# Patient Record
Sex: FEMALE | Race: WHITE | NOT HISPANIC OR LATINO | Employment: OTHER | URBAN - METROPOLITAN AREA
[De-identification: names, ages, dates, MRNs, and addresses within clinical notes are randomized per-mention and may not be internally consistent; named-entity substitution may affect disease eponyms.]

---

## 2017-01-01 PROBLEM — T84.7XXA INFECTION AT SITE OF EXTERNAL FIXATOR PIN (HCC): Status: ACTIVE | Noted: 2017-01-01

## 2017-05-09 PROBLEM — Z96.9 RETAINED ORTHOPEDIC HARDWARE: Status: ACTIVE | Noted: 2017-05-09

## 2019-09-20 ENCOUNTER — HOSPITAL ENCOUNTER (OUTPATIENT)
Dept: RADIOLOGY | Facility: MEDICAL CENTER | Age: 54
End: 2019-09-20
Attending: ORTHOPAEDIC SURGERY
Payer: MEDICARE

## 2019-09-20 DIAGNOSIS — M24.572 CONTRACTURE OF LEFT ANKLE: ICD-10-CM

## 2019-09-20 DIAGNOSIS — M79.672 PAIN IN LEFT FOOT: ICD-10-CM

## 2019-09-20 PROCEDURE — 73700 CT LOWER EXTREMITY W/O DYE: CPT | Mod: LT

## 2019-12-10 ENCOUNTER — APPOINTMENT (OUTPATIENT)
Dept: ADMISSIONS | Facility: MEDICAL CENTER | Age: 54
End: 2019-12-10
Payer: MEDICARE

## 2019-12-10 RX ORDER — ZOLPIDEM TARTRATE 10 MG/1
10 TABLET ORAL
COMMUNITY
End: 2020-10-02

## 2019-12-11 NOTE — DISCHARGE PLANNING
DISCHARGE PLANNING NOTE     Procedure: Procedure(s):  OSTEOTOMY, ORTHOPEDIC-MIDFOOT, HERRING  Procedure Date: 12/27/2019  Insurance:  Payor: MEDICARE / Plan: MEDICARE PART A & B  Equipment currently available at home? crutches, front-wheel walker, wheelchair and kneeling scooter  Toilet height? Standard  Type of shower? tub-shower  Who will be with you during your recovery? dtr  Is Outpatient Physical Therapy set up after surgery? No      Plan: Spoke with Jojo on the phone. She lives in [place], [place]. She states it has been 7 years since she obtained her wheelchair and states that it is broken. 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 She cannot use her kneeling scooter for long distance due to arthritis in her knees. She would like a tub transfer bench and 3:1 commode as she is about 60 feet from her bathroom. She would like a new set of crutches, the rubber on her current pair is worn out. She would like to see if Medicare would pay for these items if not she will purchase them.  Notified Dr. Freeman's MA of these request and asked that she submit the orders to any [employer] company but [employer].

## 2019-12-12 NOTE — DISCHARGE PLANNING
Message left for Dr. Freeman's MA requesting they submit orders for DME to any company but [employer] and have equipment sent to Jojo's home. 3:1 commode, crutches, tub transfer bench and a wheelchair.

## 2019-12-26 ENCOUNTER — ANESTHESIA EVENT (OUTPATIENT)
Dept: SURGERY | Facility: MEDICAL CENTER | Age: 54
End: 2019-12-26
Payer: MEDICARE

## 2019-12-27 ENCOUNTER — ANESTHESIA (OUTPATIENT)
Dept: SURGERY | Facility: MEDICAL CENTER | Age: 54
End: 2019-12-27
Payer: MEDICARE

## 2019-12-27 ENCOUNTER — HOSPITAL ENCOUNTER (OUTPATIENT)
Facility: MEDICAL CENTER | Age: 54
End: 2019-12-30
Attending: ORTHOPAEDIC SURGERY | Admitting: ORTHOPAEDIC SURGERY
Payer: MEDICARE

## 2019-12-27 ENCOUNTER — APPOINTMENT (OUTPATIENT)
Dept: RADIOLOGY | Facility: MEDICAL CENTER | Age: 54
End: 2019-12-27
Attending: ORTHOPAEDIC SURGERY
Payer: MEDICARE

## 2019-12-27 DIAGNOSIS — G89.18 POSTOPERATIVE PAIN: ICD-10-CM

## 2019-12-27 PROCEDURE — 96365 THER/PROPH/DIAG IV INF INIT: CPT | Mod: XU

## 2019-12-27 PROCEDURE — 700111 HCHG RX REV CODE 636 W/ 250 OVERRIDE (IP)

## 2019-12-27 PROCEDURE — 700101 HCHG RX REV CODE 250: Performed by: ANESTHESIOLOGY

## 2019-12-27 PROCEDURE — A6223 GAUZE >16<=48 NO W/SAL W/O B: HCPCS | Performed by: ORTHOPAEDIC SURGERY

## 2019-12-27 PROCEDURE — 160048 HCHG OR STATISTICAL LEVEL 1-5: Performed by: ORTHOPAEDIC SURGERY

## 2019-12-27 PROCEDURE — 700102 HCHG RX REV CODE 250 W/ 637 OVERRIDE(OP): Performed by: ANESTHESIOLOGY

## 2019-12-27 PROCEDURE — 700111 HCHG RX REV CODE 636 W/ 250 OVERRIDE (IP): Performed by: ANESTHESIOLOGY

## 2019-12-27 PROCEDURE — 700105 HCHG RX REV CODE 258

## 2019-12-27 PROCEDURE — 160036 HCHG PACU - EA ADDL 30 MINS PHASE I: Performed by: ORTHOPAEDIC SURGERY

## 2019-12-27 PROCEDURE — 160002 HCHG RECOVERY MINUTES (STAT): Performed by: ORTHOPAEDIC SURGERY

## 2019-12-27 PROCEDURE — A9270 NON-COVERED ITEM OR SERVICE: HCPCS | Performed by: ANESTHESIOLOGY

## 2019-12-27 PROCEDURE — 73600 X-RAY EXAM OF ANKLE: CPT | Mod: LT

## 2019-12-27 PROCEDURE — 500423 HCHG DRESSING, ABD COMBINE: Performed by: ORTHOPAEDIC SURGERY

## 2019-12-27 PROCEDURE — 160029 HCHG SURGERY MINUTES - 1ST 30 MINS LEVEL 4: Performed by: ORTHOPAEDIC SURGERY

## 2019-12-27 PROCEDURE — 500881 HCHG PACK, EXTREMITY: Performed by: ORTHOPAEDIC SURGERY

## 2019-12-27 PROCEDURE — A9270 NON-COVERED ITEM OR SERVICE: HCPCS | Performed by: ORTHOPAEDIC SURGERY

## 2019-12-27 PROCEDURE — 160009 HCHG ANES TIME/MIN: Performed by: ORTHOPAEDIC SURGERY

## 2019-12-27 PROCEDURE — 96375 TX/PRO/DX INJ NEW DRUG ADDON: CPT | Mod: XU

## 2019-12-27 PROCEDURE — G0378 HOSPITAL OBSERVATION PER HR: HCPCS

## 2019-12-27 PROCEDURE — 700105 HCHG RX REV CODE 258: Performed by: ORTHOPAEDIC SURGERY

## 2019-12-27 PROCEDURE — 160041 HCHG SURGERY MINUTES - EA ADDL 1 MIN LEVEL 4: Performed by: ORTHOPAEDIC SURGERY

## 2019-12-27 PROCEDURE — 501838 HCHG SUTURE GENERAL: Performed by: ORTHOPAEDIC SURGERY

## 2019-12-27 PROCEDURE — 160035 HCHG PACU - 1ST 60 MINS PHASE I: Performed by: ORTHOPAEDIC SURGERY

## 2019-12-27 PROCEDURE — 700102 HCHG RX REV CODE 250 W/ 637 OVERRIDE(OP): Performed by: ORTHOPAEDIC SURGERY

## 2019-12-27 PROCEDURE — 160022 HCHG BLOCK: Performed by: ORTHOPAEDIC SURGERY

## 2019-12-27 PROCEDURE — 96376 TX/PRO/DX INJ SAME DRUG ADON: CPT | Mod: XU

## 2019-12-27 PROCEDURE — 700111 HCHG RX REV CODE 636 W/ 250 OVERRIDE (IP): Performed by: ORTHOPAEDIC SURGERY

## 2019-12-27 RX ORDER — ALBUTEROL SULFATE 90 UG/1
1-2 AEROSOL, METERED RESPIRATORY (INHALATION) EVERY 4 HOURS PRN
Status: DISCONTINUED | OUTPATIENT
Start: 2019-12-27 | End: 2019-12-30 | Stop reason: HOSPADM

## 2019-12-27 RX ORDER — HYDROMORPHONE HYDROCHLORIDE 1 MG/ML
0.1 INJECTION, SOLUTION INTRAMUSCULAR; INTRAVENOUS; SUBCUTANEOUS
Status: DISCONTINUED | OUTPATIENT
Start: 2019-12-27 | End: 2019-12-27 | Stop reason: HOSPADM

## 2019-12-27 RX ORDER — ONDANSETRON 2 MG/ML
4 INJECTION INTRAMUSCULAR; INTRAVENOUS
Status: COMPLETED | OUTPATIENT
Start: 2019-12-27 | End: 2019-12-27

## 2019-12-27 RX ORDER — SODIUM CHLORIDE, SODIUM LACTATE, POTASSIUM CHLORIDE, CALCIUM CHLORIDE 600; 310; 30; 20 MG/100ML; MG/100ML; MG/100ML; MG/100ML
INJECTION, SOLUTION INTRAVENOUS CONTINUOUS
Status: DISCONTINUED | OUTPATIENT
Start: 2019-12-27 | End: 2019-12-27

## 2019-12-27 RX ORDER — ACETAMINOPHEN 500 MG
1000 TABLET ORAL ONCE
Status: COMPLETED | OUTPATIENT
Start: 2019-12-27 | End: 2019-12-27

## 2019-12-27 RX ORDER — DEXTROAMPHETAMINE SACCHARATE, AMPHETAMINE ASPARTATE, DEXTROAMPHETAMINE SULFATE AND AMPHETAMINE SULFATE 2.5; 2.5; 2.5; 2.5 MG/1; MG/1; MG/1; MG/1
20 TABLET ORAL DAILY
Status: DISCONTINUED | OUTPATIENT
Start: 2019-12-28 | End: 2019-12-30 | Stop reason: HOSPADM

## 2019-12-27 RX ORDER — SODIUM CHLORIDE, SODIUM LACTATE, POTASSIUM CHLORIDE, CALCIUM CHLORIDE 600; 310; 30; 20 MG/100ML; MG/100ML; MG/100ML; MG/100ML
INJECTION, SOLUTION INTRAVENOUS CONTINUOUS
Status: DISCONTINUED | OUTPATIENT
Start: 2019-12-27 | End: 2019-12-27 | Stop reason: HOSPADM

## 2019-12-27 RX ORDER — ONDANSETRON 2 MG/ML
4 INJECTION INTRAMUSCULAR; INTRAVENOUS
Status: DISCONTINUED | OUTPATIENT
Start: 2019-12-27 | End: 2019-12-27

## 2019-12-27 RX ORDER — CARBOXYMETHYLCELLULOSE SODIUM 5 MG/ML
1 SOLUTION/ DROPS OPHTHALMIC PRN
Status: DISCONTINUED | OUTPATIENT
Start: 2019-12-27 | End: 2019-12-30 | Stop reason: HOSPADM

## 2019-12-27 RX ORDER — HYDROMORPHONE HYDROCHLORIDE 1 MG/ML
0.5 INJECTION, SOLUTION INTRAMUSCULAR; INTRAVENOUS; SUBCUTANEOUS
Status: DISCONTINUED | OUTPATIENT
Start: 2019-12-27 | End: 2019-12-29

## 2019-12-27 RX ORDER — MEPERIDINE HYDROCHLORIDE 25 MG/ML
6.25 INJECTION INTRAMUSCULAR; INTRAVENOUS; SUBCUTANEOUS
Status: DISCONTINUED | OUTPATIENT
Start: 2019-12-27 | End: 2019-12-27

## 2019-12-27 RX ORDER — SODIUM CHLORIDE 9 MG/ML
INJECTION, SOLUTION INTRAVENOUS
Status: COMPLETED
Start: 2019-12-27 | End: 2019-12-28

## 2019-12-27 RX ORDER — IBUPROFEN 200 MG
800 TABLET ORAL 3 TIMES DAILY
Status: DISCONTINUED | OUTPATIENT
Start: 2019-12-27 | End: 2019-12-30 | Stop reason: HOSPADM

## 2019-12-27 RX ORDER — LIDOCAINE HYDROCHLORIDE 40 MG/ML
SOLUTION TOPICAL PRN
Status: DISCONTINUED | OUTPATIENT
Start: 2019-12-27 | End: 2019-12-27 | Stop reason: SURG

## 2019-12-27 RX ORDER — FLUTICASONE PROPIONATE 50 MCG
1 SPRAY, SUSPENSION (ML) NASAL DAILY
COMMUNITY

## 2019-12-27 RX ORDER — DIPHENHYDRAMINE HYDROCHLORIDE 50 MG/ML
12.5 INJECTION INTRAMUSCULAR; INTRAVENOUS
Status: DISCONTINUED | OUTPATIENT
Start: 2019-12-27 | End: 2019-12-27

## 2019-12-27 RX ORDER — MEPERIDINE HYDROCHLORIDE 25 MG/ML
INJECTION INTRAMUSCULAR; INTRAVENOUS; SUBCUTANEOUS PRN
Status: DISCONTINUED | OUTPATIENT
Start: 2019-12-27 | End: 2019-12-27 | Stop reason: SURG

## 2019-12-27 RX ORDER — OXYCODONE HYDROCHLORIDE 5 MG/1
5 TABLET ORAL
Status: DISCONTINUED | OUTPATIENT
Start: 2019-12-27 | End: 2019-12-27

## 2019-12-27 RX ORDER — OXYCODONE HYDROCHLORIDE AND ACETAMINOPHEN 5; 325 MG/1; MG/1
1 TABLET ORAL EVERY 4 HOURS PRN
Status: DISCONTINUED | OUTPATIENT
Start: 2019-12-27 | End: 2019-12-30 | Stop reason: HOSPADM

## 2019-12-27 RX ORDER — GABAPENTIN 300 MG/1
300 CAPSULE ORAL ONCE
Status: DISCONTINUED | OUTPATIENT
Start: 2019-12-27 | End: 2019-12-27 | Stop reason: HOSPADM

## 2019-12-27 RX ORDER — GABAPENTIN 300 MG/1
300 CAPSULE ORAL EVERY EVENING
Status: DISCONTINUED | OUTPATIENT
Start: 2019-12-27 | End: 2019-12-30 | Stop reason: HOSPADM

## 2019-12-27 RX ORDER — MIDAZOLAM HYDROCHLORIDE 1 MG/ML
1 INJECTION INTRAMUSCULAR; INTRAVENOUS
Status: DISCONTINUED | OUTPATIENT
Start: 2019-12-27 | End: 2019-12-27 | Stop reason: HOSPADM

## 2019-12-27 RX ORDER — HALOPERIDOL 5 MG/ML
1 INJECTION INTRAMUSCULAR
Status: DISCONTINUED | OUTPATIENT
Start: 2019-12-27 | End: 2019-12-27

## 2019-12-27 RX ORDER — ALBUTEROL SULFATE 90 UG/1
1-2 AEROSOL, METERED RESPIRATORY (INHALATION) EVERY 4 HOURS PRN
Status: ON HOLD | COMMUNITY
Start: 2019-08-12 | End: 2020-02-17

## 2019-12-27 RX ORDER — HALOPERIDOL 5 MG/ML
1 INJECTION INTRAMUSCULAR
Status: DISCONTINUED | OUTPATIENT
Start: 2019-12-27 | End: 2019-12-27 | Stop reason: HOSPADM

## 2019-12-27 RX ORDER — DIPHENHYDRAMINE HYDROCHLORIDE 50 MG/ML
12.5 INJECTION INTRAMUSCULAR; INTRAVENOUS
Status: DISCONTINUED | OUTPATIENT
Start: 2019-12-27 | End: 2019-12-27 | Stop reason: HOSPADM

## 2019-12-27 RX ORDER — FLUTICASONE PROPIONATE 50 MCG
1 SPRAY, SUSPENSION (ML) NASAL DAILY
Status: DISCONTINUED | OUTPATIENT
Start: 2019-12-27 | End: 2019-12-30 | Stop reason: HOSPADM

## 2019-12-27 RX ORDER — OXYCODONE HCL 5 MG/5 ML
10 SOLUTION, ORAL ORAL
Status: DISCONTINUED | OUTPATIENT
Start: 2019-12-27 | End: 2019-12-27

## 2019-12-27 RX ORDER — ZOLPIDEM TARTRATE 5 MG/1
10 TABLET ORAL NIGHTLY PRN
Status: DISCONTINUED | OUTPATIENT
Start: 2019-12-27 | End: 2019-12-30 | Stop reason: HOSPADM

## 2019-12-27 RX ORDER — OXYCODONE HCL 5 MG/5 ML
5 SOLUTION, ORAL ORAL
Status: COMPLETED | OUTPATIENT
Start: 2019-12-27 | End: 2019-12-27

## 2019-12-27 RX ORDER — ACETAMINOPHEN 500 MG
1000 TABLET ORAL EVERY 6 HOURS
Status: DISCONTINUED | OUTPATIENT
Start: 2019-12-27 | End: 2019-12-27

## 2019-12-27 RX ORDER — ONDANSETRON 2 MG/ML
4 INJECTION INTRAMUSCULAR; INTRAVENOUS EVERY 4 HOURS PRN
Status: DISCONTINUED | OUTPATIENT
Start: 2019-12-27 | End: 2019-12-30 | Stop reason: HOSPADM

## 2019-12-27 RX ORDER — OXYCODONE AND ACETAMINOPHEN 10; 325 MG/1; MG/1
1 TABLET ORAL EVERY 4 HOURS PRN
Status: DISCONTINUED | OUTPATIENT
Start: 2019-12-27 | End: 2019-12-30 | Stop reason: HOSPADM

## 2019-12-27 RX ORDER — LIDOCAINE HYDROCHLORIDE 10 MG/ML
INJECTION, SOLUTION EPIDURAL; INFILTRATION; INTRACAUDAL; PERINEURAL
Status: COMPLETED
Start: 2019-12-27 | End: 2019-12-27

## 2019-12-27 RX ORDER — DEXAMETHASONE SODIUM PHOSPHATE 4 MG/ML
INJECTION, SOLUTION INTRA-ARTICULAR; INTRALESIONAL; INTRAMUSCULAR; INTRAVENOUS; SOFT TISSUE PRN
Status: DISCONTINUED | OUTPATIENT
Start: 2019-12-27 | End: 2019-12-27 | Stop reason: SURG

## 2019-12-27 RX ORDER — OXYCODONE HCL 5 MG/5 ML
5 SOLUTION, ORAL ORAL
Status: DISCONTINUED | OUTPATIENT
Start: 2019-12-27 | End: 2019-12-27

## 2019-12-27 RX ORDER — ONDANSETRON 2 MG/ML
INJECTION INTRAMUSCULAR; INTRAVENOUS PRN
Status: DISCONTINUED | OUTPATIENT
Start: 2019-12-27 | End: 2019-12-27 | Stop reason: SURG

## 2019-12-27 RX ORDER — DEXTROAMPHETAMINE SACCHARATE, AMPHETAMINE ASPARTATE, DEXTROAMPHETAMINE SULFATE AND AMPHETAMINE SULFATE 5; 5; 5; 5 MG/1; MG/1; MG/1; MG/1
20 TABLET ORAL DAILY
Status: ON HOLD | COMMUNITY
End: 2020-02-17

## 2019-12-27 RX ORDER — OXYCODONE HCL 5 MG/5 ML
10 SOLUTION, ORAL ORAL
Status: COMPLETED | OUTPATIENT
Start: 2019-12-27 | End: 2019-12-27

## 2019-12-27 RX ORDER — HYDROMORPHONE HYDROCHLORIDE 1 MG/ML
0.2 INJECTION, SOLUTION INTRAMUSCULAR; INTRAVENOUS; SUBCUTANEOUS
Status: DISCONTINUED | OUTPATIENT
Start: 2019-12-27 | End: 2019-12-27 | Stop reason: HOSPADM

## 2019-12-27 RX ORDER — MEPERIDINE HYDROCHLORIDE 25 MG/ML
6.25 INJECTION INTRAMUSCULAR; INTRAVENOUS; SUBCUTANEOUS
Status: DISCONTINUED | OUTPATIENT
Start: 2019-12-27 | End: 2019-12-27 | Stop reason: HOSPADM

## 2019-12-27 RX ORDER — CEFAZOLIN SODIUM 2 G/100ML
2 INJECTION, SOLUTION INTRAVENOUS EVERY 8 HOURS
Status: COMPLETED | OUTPATIENT
Start: 2019-12-27 | End: 2019-12-28

## 2019-12-27 RX ORDER — HYDROMORPHONE HYDROCHLORIDE 1 MG/ML
0.4 INJECTION, SOLUTION INTRAMUSCULAR; INTRAVENOUS; SUBCUTANEOUS
Status: DISCONTINUED | OUTPATIENT
Start: 2019-12-27 | End: 2019-12-27 | Stop reason: HOSPADM

## 2019-12-27 RX ORDER — BUPIVACAINE HYDROCHLORIDE 5 MG/ML
INJECTION, SOLUTION EPIDURAL; INTRACAUDAL PRN
Status: DISCONTINUED | OUTPATIENT
Start: 2019-12-27 | End: 2019-12-27 | Stop reason: SURG

## 2019-12-27 RX ORDER — OXYCODONE HYDROCHLORIDE 10 MG/1
10 TABLET ORAL
Status: DISCONTINUED | OUTPATIENT
Start: 2019-12-27 | End: 2019-12-27

## 2019-12-27 RX ADMIN — MIDAZOLAM 1 MG: 1 INJECTION INTRAMUSCULAR; INTRAVENOUS at 12:36

## 2019-12-27 RX ADMIN — CEFAZOLIN SODIUM 2 G: 2 INJECTION, SOLUTION INTRAVENOUS at 23:42

## 2019-12-27 RX ADMIN — FENTANYL CITRATE 50 MCG: 0.05 INJECTION, SOLUTION INTRAMUSCULAR; INTRAVENOUS at 12:30

## 2019-12-27 RX ADMIN — SODIUM CHLORIDE: 9 INJECTION, SOLUTION INTRAVENOUS at 15:30

## 2019-12-27 RX ADMIN — MEPERIDINE HYDROCHLORIDE 25 MG: 25 INJECTION INTRAMUSCULAR; INTRAVENOUS; SUBCUTANEOUS at 11:25

## 2019-12-27 RX ADMIN — Medication 0.5 ML: at 09:30

## 2019-12-27 RX ADMIN — OXYCODONE HYDROCHLORIDE 10 MG: 5 SOLUTION ORAL at 12:01

## 2019-12-27 RX ADMIN — SODIUM CHLORIDE, POTASSIUM CHLORIDE, SODIUM LACTATE AND CALCIUM CHLORIDE: 600; 310; 30; 20 INJECTION, SOLUTION INTRAVENOUS at 09:32

## 2019-12-27 RX ADMIN — LIDOCAINE HYDROCHLORIDE 0.5 ML: 10 INJECTION, SOLUTION EPIDURAL; INFILTRATION; INTRACAUDAL at 09:30

## 2019-12-27 RX ADMIN — ROCURONIUM BROMIDE 50 MG: 10 INJECTION, SOLUTION INTRAVENOUS at 09:58

## 2019-12-27 RX ADMIN — OXYCODONE HYDROCHLORIDE AND ACETAMINOPHEN 1 TABLET: 10; 325 TABLET ORAL at 18:36

## 2019-12-27 RX ADMIN — CEFAZOLIN SODIUM 2 G: 2 INJECTION, SOLUTION INTRAVENOUS at 15:23

## 2019-12-27 RX ADMIN — DEXAMETHASONE SODIUM PHOSPHATE 8 MG: 4 INJECTION, SOLUTION INTRA-ARTICULAR; INTRALESIONAL; INTRAMUSCULAR; INTRAVENOUS; SOFT TISSUE at 10:10

## 2019-12-27 RX ADMIN — MIDAZOLAM 1 MG: 1 INJECTION INTRAMUSCULAR; INTRAVENOUS at 12:11

## 2019-12-27 RX ADMIN — FENTANYL CITRATE 100 MCG: 50 INJECTION, SOLUTION INTRAMUSCULAR; INTRAVENOUS at 11:30

## 2019-12-27 RX ADMIN — HYDROMORPHONE HYDROCHLORIDE 0.5 MG: 1 INJECTION, SOLUTION INTRAMUSCULAR; INTRAVENOUS; SUBCUTANEOUS at 23:42

## 2019-12-27 RX ADMIN — LIDOCAINE HYDROCHLORIDE 4 ML: 40 SOLUTION TOPICAL at 10:00

## 2019-12-27 RX ADMIN — PROPOFOL 200 MG: 10 INJECTION, EMULSION INTRAVENOUS at 09:58

## 2019-12-27 RX ADMIN — ONDANSETRON 4 MG: 2 INJECTION INTRAMUSCULAR; INTRAVENOUS at 11:00

## 2019-12-27 RX ADMIN — CLINDAMYCIN PHOSPHATE 900 MG: 150 INJECTION, SOLUTION INTRAMUSCULAR; INTRAVENOUS at 09:59

## 2019-12-27 RX ADMIN — BUPIVACAINE HYDROCHLORIDE 30 ML: 5 INJECTION, SOLUTION EPIDURAL; INTRACAUDAL; PERINEURAL at 10:02

## 2019-12-27 RX ADMIN — HYDROMORPHONE HYDROCHLORIDE 0.5 MG: 1 INJECTION, SOLUTION INTRAMUSCULAR; INTRAVENOUS; SUBCUTANEOUS at 19:46

## 2019-12-27 RX ADMIN — HYDROMORPHONE HYDROCHLORIDE 0.5 MG: 1 INJECTION, SOLUTION INTRAMUSCULAR; INTRAVENOUS; SUBCUTANEOUS at 16:38

## 2019-12-27 RX ADMIN — IBUPROFEN 800 MG: 200 TABLET, FILM COATED ORAL at 23:42

## 2019-12-27 RX ADMIN — OXYCODONE HYDROCHLORIDE 10 MG: 10 TABLET ORAL at 15:21

## 2019-12-27 RX ADMIN — ONDANSETRON 4 MG: 2 INJECTION INTRAMUSCULAR; INTRAVENOUS at 12:09

## 2019-12-27 RX ADMIN — FENTANYL CITRATE 50 MCG: 0.05 INJECTION, SOLUTION INTRAMUSCULAR; INTRAVENOUS at 12:00

## 2019-12-27 RX ADMIN — HALOPERIDOL LACTATE 1 MG: 5 INJECTION, SOLUTION INTRAMUSCULAR at 12:45

## 2019-12-27 RX ADMIN — OXYCODONE HYDROCHLORIDE AND ACETAMINOPHEN 1 TABLET: 10; 325 TABLET ORAL at 22:46

## 2019-12-27 RX ADMIN — ACETAMINOPHEN 1000 MG: 500 TABLET ORAL at 09:30

## 2019-12-27 SDOH — HEALTH STABILITY: MENTAL HEALTH: HOW OFTEN DO YOU HAVE 6 OR MORE DRINKS ON ONE OCCASION?: NEVER

## 2019-12-27 SDOH — HEALTH STABILITY: MENTAL HEALTH: HOW OFTEN DO YOU HAVE A DRINK CONTAINING ALCOHOL?: NEVER

## 2019-12-27 ASSESSMENT — PATIENT HEALTH QUESTIONNAIRE - PHQ9
2. FEELING DOWN, DEPRESSED, IRRITABLE, OR HOPELESS: NOT AT ALL
1. LITTLE INTEREST OR PLEASURE IN DOING THINGS: NOT AT ALL
SUM OF ALL RESPONSES TO PHQ9 QUESTIONS 1 AND 2: 0

## 2019-12-27 ASSESSMENT — LIFESTYLE VARIABLES
TOTAL SCORE: 0
CONSUMPTION TOTAL: NEGATIVE
TOTAL SCORE: 0
HAVE PEOPLE ANNOYED YOU BY CRITICIZING YOUR DRINKING: NO
TOTAL SCORE: 0
AVERAGE NUMBER OF DAYS PER WEEK YOU HAVE A DRINK CONTAINING ALCOHOL: 0
EVER FELT BAD OR GUILTY ABOUT YOUR DRINKING: NO
ON A TYPICAL DAY WHEN YOU DRINK ALCOHOL HOW MANY DRINKS DO YOU HAVE: 0
HAVE YOU EVER FELT YOU SHOULD CUT DOWN ON YOUR DRINKING: NO
HOW MANY TIMES IN THE PAST YEAR HAVE YOU HAD 5 OR MORE DRINKS IN A DAY: 0
EVER HAD A DRINK FIRST THING IN THE MORNING TO STEADY YOUR NERVES TO GET RID OF A HANGOVER: NO
ALCOHOL_USE: NO
DOES PATIENT WANT TO STOP DRINKING: NO
EVER_SMOKED: NEVER

## 2019-12-27 ASSESSMENT — COGNITIVE AND FUNCTIONAL STATUS - GENERAL
CLIMB 3 TO 5 STEPS WITH RAILING: A LITTLE
TOILETING: A LITTLE
SUGGESTED CMS G CODE MODIFIER MOBILITY: CJ
HELP NEEDED FOR BATHING: A LITTLE
DRESSING REGULAR LOWER BODY CLOTHING: A LITTLE
SUGGESTED CMS G CODE MODIFIER DAILY ACTIVITY: CJ
MOBILITY SCORE: 22
WALKING IN HOSPITAL ROOM: A LITTLE
DAILY ACTIVITIY SCORE: 21

## 2019-12-27 ASSESSMENT — PAIN SCALES - GENERAL: PAIN_LEVEL: 5

## 2019-12-27 ASSESSMENT — COPD QUESTIONNAIRES
DO YOU EVER COUGH UP ANY MUCUS OR PHLEGM?: NO/ONLY WITH OCCASIONAL COLDS OR INFECTIONS
IN THE PAST 12 MONTHS DO YOU DO LESS THAN YOU USED TO BECAUSE OF YOUR BREATHING PROBLEMS: DISAGREE/UNSURE
DURING THE PAST 4 WEEKS HOW MUCH DID YOU FEEL SHORT OF BREATH: NONE/LITTLE OF THE TIME
COPD SCREENING SCORE: 1
HAVE YOU SMOKED AT LEAST 100 CIGARETTES IN YOUR ENTIRE LIFE: NO/DON'T KNOW

## 2019-12-27 NOTE — OP REPORT
DATE OF SERVICE:  12/27/2019    PREOPERATIVE DIAGNOSES:  1.  Left Achilles contracture.  2.  Left previous Achilles surgery with calcifications.  3.  Left anterior ankle impingement.  4.  Left lateral foot overloading.    POSTOPERATIVE DIAGNOSES:  1.  Left Achilles contracture.  2.  Left previous Achilles surgery with calcifications.  3.  Left anterior ankle impingement.  4.  Left lateral foot overloading.    PROCEDURES PERFORMED:  1.  Left Achilles debridement including calcifications.  2.  Left Achilles lengthening, open Z lengthening.  3.  Left posterior ankle capsular release.  4.  Left ankle manipulation.  5.  Left ankle arthrotomy with synovectomy, anterior.  6.  Left partial excision distal tibia for bossing.  7.  Left partial excision dorsal anterior talus for bossing.  8.  Left plantar fifth metatarsal base excision.    SURGEON:  Home Freeman MD    FIRST ASSISTANT:  Veronique Oviedo MD    SECOND ASSISTANT:  Anisa Soto.    ANESTHESIA:  General endotracheal with popliteal block per my request for   postoperative pain management.    ESTIMATED BLOOD LOSS:  None.    COMPLICATIONS:  None.    POSTOPERATIVE PLAN:  1.  Nonweightbearing.  2.  Perioperative antibiotics.  3.  Follow up in 2 weeks.  4.  Admit for pain control.    INDICATIONS:  The patient is a pleasant 54-year-old female.  She has had   problems with her left ankle for some time.  The above diagnoses made and   options were discussed with her including operative and nonoperative.  She   elected to undergo operative intervention.  The above procedure was discussed   and all questions were answered.  Risks of surgery were explained, which   include but not limited to wound problems, infection, nerve injury, vascular   injury, need for further surgery.  She understands she could have persistent   risks of her pain and/or need for further surgery.  She understands and   accepts these risks and agrees to proceed.  Her site was marked by myself   prior  to receiving psychotropic medicines.    PROCEDURE IN DETAIL:  The patient was given a popliteal block per my request   for postoperative pain management.  She was brought to the operating room and   underwent general endotracheal anesthesia without complications.  Her left   lower extremity was prepped and draped in standard fashion in the prone   position initial with all appropriate padding.  Positive site verification   confirmed her left lower extremity as well as above procedure and confirmation   that she received preoperative antibiotics.  Esmarch was used to exsanguinate   her foot and ankle and leg tourniquet was inflated 250 mmHg.  A longitudinal   incision was made starting above her previous incision and going distal.  This   was carefully dissected down to avoid injury to the sural nerve.  Once this   was down to the Achilles tendon, this was then split.  She had multiple areas   of calcification and these were excised.  On the most lateral aspect, she   still had some Achilles intact.  Once this was intact, a Z Z-lengthening was   performed.  Ankle manipulation was performed and I was able to get some   improvement, but not a significant improvement in dorsiflexion.  Posterior   dissection was then made.  We stayed to the lateral side of her FHL and the   FHL was then elevated up and off of the posterior ankle.  A Tai elevator was   used to carefully release posterior ankle capsule.  This also gave some   improvement in dorsiflexion.  However, anterior impingement felt as though it   was blocking her ankle.  The wound was irrigated with copious irrigation.  It   was closed in a layered fashion with 3-0 Vicryl and 3-0 nylon.  A sterile   Tegaderm was placed.    The patient was then repositioned, prepped and draped in the supine position   with all appropriate padding.  A medial arthrotomy was made, carefully   dissected down.  She had a lot of synovitis in the anterior part of her ankle   and so a full  synovectomy was performed with a rongeur down to visualization   of her cartilage of her talus.  An osteotome was then used to remove the   anterior distal tibia from medial to lateral and a gouge was then used to   remove the portion of her talus that was causing anterior impingement.  This   was cut just anterior to her cartilage of her ankle joint.  Once this was   completely excised from medial to lateral, further ankle manipulation was   performed gently and I was able to stretch her up to approximately 10 degrees   of dorsiflexion.  At that time, reevaluation of her foot demonstrated that her   forefoot really was neutral to the long axis of her tibia.  She had a little   bit of prominence at the base of her fifth metatarsal as she was overloading   and so a separate incision was made over the base of her fifth metatarsal.    This was carefully dissected down to avoid injury to the sural nerve.  Once   this was down, a microsagittal saw was used to remove the plantar and lateral   aspect of the fifth metatarsal base.  This was smoothed by buffing it out and   there was no further prominences.  At the completion, her foot had good   neutral alignment.  There were no further prominences laterally.  Wounds were   then irrigated with copious irrigation.  They were closed in a layered fashion   with 3-0 Vicryl and 3-0 nylon.  Sterile dressings were applied.  Tourniquet   released.  All toes were pink.  She was transferred to recovery room in good   condition.    Utilization of Dr. Oviedo was necessary for patient positioning, holding,   retracting, closure, dressing and splint placement.  She was present   throughout the entire procedure.       ____________________________________     MD ADAM SELF / NTS    DD:  12/27/2019 15:11:35  DT:  12/27/2019 15:44:28    D#:  [id number]  Job#:  [id number]    cc: [facility]

## 2019-12-27 NOTE — PROGRESS NOTES
"While transferring patient to commode, she refused the use of the walker and stated, \" I have done this my whole life, I don't need a walker or someone to help me get to the toilet\". This RN educated patient of her fall risk at this point and that for her own safety, we need to assist her while she gets out of bed for any reason. Patient stated understanding and asked me to leave while she used the commode; this RN asked patient to call staff when she was finished. Approximately 2-3 minutes later, CNA entered room and found patient in bed. Patient re-educated on her need to call staff. Patient refusing bed alarm as she has history of chronic hip problems. Charting outside of room.   "

## 2019-12-27 NOTE — PROGRESS NOTES
"Patient in pre op, arrived at 0830 for 945 surgery. Pt angrily refused to do chlorhexidine wipes due to \"it will thomas my skin.\" pt tried iodine swabs and stated it burned her nose and wiped it out with water. Pt educated on purpose of the triple aim process and still refuses.   "

## 2019-12-27 NOTE — ANESTHESIA PROCEDURE NOTES
Peripheral Block  Date/Time: 12/27/2019 10:02 AM  Performed by: Reggie Peoples M.D.  Authorized by: Reggie Peoples M.D.     Patient Location:  OR  Start Time:  12/27/2019 10:02 AM  End Time:  12/27/2019 10:04 AM  Reason for Block: at surgeon's request and post-op pain management    patient identified, IV checked, site marked, risks and benefits discussed, surgical consent, monitors and equipment checked, pre-op evaluation and timeout performed    Patient Position:  Supine  Prep: ChloraPrep    Monitoring:  Heart rate, continuous pulse ox and cardiac monitor  Block Region:  Lower Extremity  Lower Extremity - Block Type:  SCIATIC nerve block, lateral approach    Laterality:  Left  Procedures: ultrasound guided  Image captured, interpreted and electronically stored.  Local Infiltration:  Lidocaine  Strength:  1 %  Dose:  3 ml  Block Type:  Single-shot  Needle Length:  100mm  Needle Gauge:  21 G  Needle Localization:  Ultrasound guidance  Injection Assessment:  Negative aspiration for heme, no paresthesia on injection, incremental injection and local visualized surrounding nerve on ultrasound  Evidence of intravascular injection: No     US Guided Sciatic Nerve Block   US probe placed several cm proximal to popliteal crease on posterior thigh and scanned caudad and cephalad until Sciatic Nerve (SN) identified superficial/lateral to popliteal artery.  Needle inserted lateral to probe in an in plane approach under direct visualization to a perineural position.  After negative aspiration LA injected with ease and visualized surrounding the SN.   performed under general anesthesia per patient request.

## 2019-12-27 NOTE — ANESTHESIA POSTPROCEDURE EVALUATION
Patient: Jojo Walker    Procedure Summary     Date:  12/27/19 Room / Location:  [place] [address] / SURGERY [facility]    Anesthesia Start:  0952 Anesthesia Stop:  1138    Procedure:  OSTEOTOMY, ORTHOPEDIC-MIDFOOT, HERRING (Left Foot) Diagnosis:  (PAIN IN LEFT FOOT)    Surgeon:  Home Freeman M.D. Responsible Provider:  Reggie Peoples M.D.    Anesthesia Type:  general, peripheral nerve block ASA Status:  2          Final Anesthesia Type: general, peripheral nerve block  Last vitals  BP   Blood Pressure: (!) 95/53    Temp   35.9 °C (96.7 °F)    Pulse   Pulse: (!) 54   Resp   15    SpO2   100 %      Anesthesia Post Evaluation    Patient location during evaluation: PACU  Patient participation: complete - patient participated  Level of consciousness: awake and alert  Pain score: 5    Airway patency: patent  Anesthetic complications: no  Cardiovascular status: hemodynamically stable  Respiratory status: acceptable  Hydration status: euvolemic    PONV: none           Nurse Pain Score: 8 (NPRS)

## 2019-12-27 NOTE — ANESTHESIA TIME REPORT
Anesthesia Start and Stop Event Times     Date Time Event    12/27/2019 0942 Ready for Procedure     0952 Anesthesia Start     1138 Anesthesia Stop        Responsible Staff  12/27/19    Name Role Begin End    Reggie Peoples M.D. Anesth 0952 1138        Preop Diagnosis (Free Text):  Pre-op Diagnosis     PAIN IN LEFT FOOT        Preop Diagnosis (Codes):    Post op Diagnosis  Left foot pain      Premium Reason  Non-Premium    Comments: block

## 2019-12-27 NOTE — OR NURSING
"Pt sleeping in stable condition; arouses easily and will c/o of L foot \"throbbing\". Updated daughter; report given to Allyson CAIN on Ortho.  "

## 2019-12-27 NOTE — OR SURGEON
Immediate Post OP Note    PreOp Diagnosis: left equinus deformity    PostOp Diagnosis: same    Procedure(s):  OSTEOTOMY, ORTHOPEDIC-MIDFOOT, HERRING - Wound Class: Clean  gastroc recession  Capsular release  Lateral metatarsal osteoctomy    Surgeon(s):  Home Freeman M.D.    Anesthesiologist/Type of Anesthesia:  Anesthesiologist: Reggie Peoples M.D./General    Surgical Staff:  Circulator: Elvia Carmona R.N.; Lukas D. Gansert, R.N.  Scrub Person: Guadalupe Pete  First Assist: Anisa Soto  Radiology Technologist: Marina Reina    Specimens removed if any:  * No specimens in log *    Estimated Blood Loss: 2 cc    Findings: 2 cc    Complications:         12/27/2019 11:36 AM Veronique Oviedo M.D.

## 2019-12-27 NOTE — OR NURSING
"Pt awake and stable; c/o severe \"throbbing\" pain on top of her foot. Daughter updated to pt status. Awaiting room assignment.  "

## 2019-12-27 NOTE — PROGRESS NOTES
Patient pre-op assessment completed, patient very anxious and worried about pain after surgery. Pt voices concerns to Md Freeman and [place]. Multiple discussions had about coming up with a plan the patient is comfortable with. pt updated on plan of care, all questions answered, no further needs at this time, call light within reach.

## 2019-12-27 NOTE — ANESTHESIA PREPROCEDURE EVALUATION
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   (+) Hormone replacement therapy (HRT)   (+) Infection at site of external fixator pin (HCC)   (+) Pain in thoracic spine   (+) Retained orthopedic hardware     Vitals:    12/27/19 0829   BP: 134/65   Pulse: 72   Resp: 18   Temp: 36.4 °C (97.5 °F)   SpO2: 97%       Physical Exam    Airway   Mallampati: II  TM distance: >3 FB  Neck ROM: full       Cardiovascular - normal exam  Rhythm: regular  Rate: normal  (-) murmur     Dental - normal exam         Pulmonary - normal exam  Breath sounds clear to auscultation     Abdominal    Neurological - normal exam                 Anesthesia Plan    ASA 2       Plan - general and peripheral nerve block     Peripheral nerve block will be post-op pain control  Airway plan will be LMA        Induction: intravenous    Postoperative Plan: Postoperative administration of opioids is intended.    Pertinent diagnostic labs and testing reviewed    Informed Consent:    Anesthetic plan and risks discussed with patient.    Use of blood products discussed with: patient whom consented to blood products.

## 2019-12-27 NOTE — PROGRESS NOTES
2 RN skin check complete with ANT Rodrigez.  Devices in place SCD to right lower extremity and nasal cannula.  Skin assessed under devices Yes.  Confirmed pressure ulcers found on NA.  New potential pressure ulcers noted on NA. Wound consult placed NA.  The following interventions in place Encourage Q2 hour turns while in bed, ambulation and pillows in use for support.   Dressing to left lower extremity, clean, dry and intact.

## 2019-12-28 PROCEDURE — G0378 HOSPITAL OBSERVATION PER HR: HCPCS

## 2019-12-28 PROCEDURE — 96372 THER/PROPH/DIAG INJ SC/IM: CPT

## 2019-12-28 PROCEDURE — 700102 HCHG RX REV CODE 250 W/ 637 OVERRIDE(OP): Performed by: ORTHOPAEDIC SURGERY

## 2019-12-28 PROCEDURE — A9270 NON-COVERED ITEM OR SERVICE: HCPCS | Performed by: ORTHOPAEDIC SURGERY

## 2019-12-28 PROCEDURE — 96366 THER/PROPH/DIAG IV INF ADDON: CPT

## 2019-12-28 PROCEDURE — 96376 TX/PRO/DX INJ SAME DRUG ADON: CPT

## 2019-12-28 PROCEDURE — 700111 HCHG RX REV CODE 636 W/ 250 OVERRIDE (IP): Performed by: ORTHOPAEDIC SURGERY

## 2019-12-28 RX ADMIN — OXYCODONE HYDROCHLORIDE AND ACETAMINOPHEN 1 TABLET: 10; 325 TABLET ORAL at 06:18

## 2019-12-28 RX ADMIN — HYDROMORPHONE HYDROCHLORIDE 0.5 MG: 1 INJECTION, SOLUTION INTRAMUSCULAR; INTRAVENOUS; SUBCUTANEOUS at 11:30

## 2019-12-28 RX ADMIN — CEFAZOLIN SODIUM 2 G: 2 INJECTION, SOLUTION INTRAVENOUS at 06:18

## 2019-12-28 RX ADMIN — OXYCODONE HYDROCHLORIDE AND ACETAMINOPHEN 1 TABLET: 10; 325 TABLET ORAL at 14:26

## 2019-12-28 RX ADMIN — HYDROMORPHONE HYDROCHLORIDE 0.5 MG: 1 INJECTION, SOLUTION INTRAMUSCULAR; INTRAVENOUS; SUBCUTANEOUS at 15:53

## 2019-12-28 RX ADMIN — ENOXAPARIN SODIUM 40 MG: 100 INJECTION SUBCUTANEOUS at 08:28

## 2019-12-28 RX ADMIN — ZOLPIDEM TARTRATE 10 MG: 5 TABLET ORAL at 20:06

## 2019-12-28 RX ADMIN — HYDROMORPHONE HYDROCHLORIDE 0.5 MG: 1 INJECTION, SOLUTION INTRAMUSCULAR; INTRAVENOUS; SUBCUTANEOUS at 03:34

## 2019-12-28 RX ADMIN — IBUPROFEN 800 MG: 200 TABLET, FILM COATED ORAL at 08:28

## 2019-12-28 RX ADMIN — OXYCODONE HYDROCHLORIDE AND ACETAMINOPHEN 1 TABLET: 10; 325 TABLET ORAL at 10:23

## 2019-12-28 RX ADMIN — OXYCODONE HYDROCHLORIDE AND ACETAMINOPHEN 1 TABLET: 5; 325 TABLET ORAL at 02:20

## 2019-12-28 RX ADMIN — HYDROMORPHONE HYDROCHLORIDE 0.5 MG: 1 INJECTION, SOLUTION INTRAMUSCULAR; INTRAVENOUS; SUBCUTANEOUS at 07:39

## 2019-12-28 RX ADMIN — FLUTICASONE PROPIONATE 50 MCG: 50 SPRAY, METERED NASAL at 06:18

## 2019-12-28 RX ADMIN — OXYCODONE HYDROCHLORIDE AND ACETAMINOPHEN 1 TABLET: 10; 325 TABLET ORAL at 20:06

## 2019-12-28 RX ADMIN — IBUPROFEN 800 MG: 200 TABLET, FILM COATED ORAL at 17:05

## 2019-12-28 NOTE — CARE PLAN
Problem: Communication  Goal: The ability to communicate needs accurately and effectively will improve  Outcome: PROGRESSING AS EXPECTED  Intervention: Educate patient and significant other/support system about the plan of care, procedures, treatments, medications and allow for questions  Note:   Patient alert and oriented, she is able to communicate needs effectively with the use of her call light. Updated in regards to POC for this shift, answered questions accordingly. Verbalizes understanding, hourly rounding in progress.     Problem: Pain Management  Goal: Pain level will decrease to patient's comfort goal  Outcome: PROGRESSING AS EXPECTED  Intervention: Educate and implement non-pharmacologic comfort measures. Examples: relaxation, distration, play therapy, activity therapy, massage, etc.  Note:   Patient aware of pharmacological interventions available, verbalizes understanding. Education about non-pharmacological interventions provided, patient verbalizes understanding.      Problem: Respiratory:  Goal: Respiratory status will improve  Outcome: PROGRESSING AS EXPECTED  Intervention: Educate and encourage incentive spirometry usage  Note:   Assessed patient's ability to perform deep breathing exercises, patient demonstrates task appropriately. Provided education about importance of using incentive spirometry, at a minimum of ten times per hour. Patient verbalizes understanding and demonstrates task appropriately.

## 2019-12-28 NOTE — PROGRESS NOTES
Patient stating that pain is uncontrolled with the Roxicodone 10mg. Patient stated that she takes Percocet at home. Paged .

## 2019-12-28 NOTE — CARE PLAN
Problem: Safety  Goal: Will remain free from falls  Outcome: PROGRESSING AS EXPECTED  Patient re-educated on fall risk and need to call for staff when getting out of bed. Patient states understanding. Call light within reach, bed in locked and lowest position, DME (including commode) in bathroom, refused fall alarm, RN charting outside of room.      Problem: Knowledge Deficit  Goal: Knowledge of disease process/condition, treatment plan, diagnostic tests, and medications will improve  Outcome: PROGRESSING AS EXPECTED   Patient understands plan of care at this time. All questions answered.

## 2019-12-29 PROBLEM — G89.18 POSTOPERATIVE PAIN: Status: ACTIVE | Noted: 2019-12-29

## 2019-12-29 PROCEDURE — 700111 HCHG RX REV CODE 636 W/ 250 OVERRIDE (IP): Performed by: PHYSICIAN ASSISTANT

## 2019-12-29 PROCEDURE — 96372 THER/PROPH/DIAG INJ SC/IM: CPT

## 2019-12-29 PROCEDURE — G0378 HOSPITAL OBSERVATION PER HR: HCPCS

## 2019-12-29 PROCEDURE — A9270 NON-COVERED ITEM OR SERVICE: HCPCS | Performed by: ORTHOPAEDIC SURGERY

## 2019-12-29 PROCEDURE — 700112 HCHG RX REV CODE 229: Performed by: PHYSICIAN ASSISTANT

## 2019-12-29 PROCEDURE — 700102 HCHG RX REV CODE 250 W/ 637 OVERRIDE(OP): Performed by: PHYSICIAN ASSISTANT

## 2019-12-29 PROCEDURE — A9270 NON-COVERED ITEM OR SERVICE: HCPCS | Performed by: PHYSICIAN ASSISTANT

## 2019-12-29 PROCEDURE — 700102 HCHG RX REV CODE 250 W/ 637 OVERRIDE(OP): Performed by: ORTHOPAEDIC SURGERY

## 2019-12-29 PROCEDURE — 700111 HCHG RX REV CODE 636 W/ 250 OVERRIDE (IP): Performed by: ORTHOPAEDIC SURGERY

## 2019-12-29 PROCEDURE — 96376 TX/PRO/DX INJ SAME DRUG ADON: CPT

## 2019-12-29 RX ORDER — OXYCODONE AND ACETAMINOPHEN 10; 325 MG/1; MG/1
1 TABLET ORAL EVERY 4 HOURS PRN
Qty: 30 TAB | Refills: 0 | Status: SHIPPED | OUTPATIENT
Start: 2019-12-29 | End: 2020-01-05

## 2019-12-29 RX ORDER — IBUPROFEN 800 MG/1
800 TABLET ORAL 3 TIMES DAILY
Qty: 30 TAB | Refills: 0 | Status: ON HOLD | OUTPATIENT
Start: 2019-12-29 | End: 2020-02-17

## 2019-12-29 RX ORDER — ESTRADIOL 1 MG/1
0.5 TABLET ORAL DAILY
Status: DISCONTINUED | OUTPATIENT
Start: 2019-12-29 | End: 2019-12-30 | Stop reason: HOSPADM

## 2019-12-29 RX ORDER — DOCUSATE SODIUM 100 MG/1
100 CAPSULE, LIQUID FILLED ORAL 2 TIMES DAILY PRN
Status: DISCONTINUED | OUTPATIENT
Start: 2019-12-29 | End: 2019-12-30 | Stop reason: HOSPADM

## 2019-12-29 RX ORDER — GABAPENTIN 300 MG/1
300 CAPSULE ORAL EVERY EVENING
Qty: 90 CAP | Refills: 0 | Status: SHIPPED | OUTPATIENT
Start: 2019-12-29 | End: 2020-02-14

## 2019-12-29 RX ORDER — PSEUDOEPHEDRINE HCL 30 MG
100 TABLET ORAL 2 TIMES DAILY PRN
Qty: 60 CAP | Refills: 0 | Status: ON HOLD | OUTPATIENT
Start: 2019-12-29 | End: 2020-02-17

## 2019-12-29 RX ORDER — HYDROMORPHONE HYDROCHLORIDE 1 MG/ML
0.5 INJECTION, SOLUTION INTRAMUSCULAR; INTRAVENOUS; SUBCUTANEOUS EVERY 8 HOURS PRN
Status: DISCONTINUED | OUTPATIENT
Start: 2019-12-29 | End: 2019-12-29

## 2019-12-29 RX ORDER — HYDROMORPHONE HYDROCHLORIDE 1 MG/ML
0.5 INJECTION, SOLUTION INTRAMUSCULAR; INTRAVENOUS; SUBCUTANEOUS
Status: DISCONTINUED | OUTPATIENT
Start: 2019-12-29 | End: 2019-12-30 | Stop reason: HOSPADM

## 2019-12-29 RX ADMIN — IBUPROFEN 800 MG: 200 TABLET, FILM COATED ORAL at 18:10

## 2019-12-29 RX ADMIN — OXYCODONE HYDROCHLORIDE AND ACETAMINOPHEN 1 TABLET: 10; 325 TABLET ORAL at 08:30

## 2019-12-29 RX ADMIN — OXYCODONE HYDROCHLORIDE AND ACETAMINOPHEN 1 TABLET: 10; 325 TABLET ORAL at 00:18

## 2019-12-29 RX ADMIN — HYDROMORPHONE HYDROCHLORIDE 0.5 MG: 1 INJECTION, SOLUTION INTRAMUSCULAR; INTRAVENOUS; SUBCUTANEOUS at 13:30

## 2019-12-29 RX ADMIN — FLUTICASONE PROPIONATE 50 MCG: 50 SPRAY, METERED NASAL at 04:23

## 2019-12-29 RX ADMIN — OXYCODONE HYDROCHLORIDE AND ACETAMINOPHEN 1 TABLET: 10; 325 TABLET ORAL at 12:20

## 2019-12-29 RX ADMIN — DOCUSATE SODIUM 100 MG: 100 CAPSULE, LIQUID FILLED ORAL at 15:28

## 2019-12-29 RX ADMIN — ENOXAPARIN SODIUM 40 MG: 100 INJECTION SUBCUTANEOUS at 08:30

## 2019-12-29 RX ADMIN — ESTRADIOL 0.5 MG: 1 TABLET ORAL at 15:28

## 2019-12-29 RX ADMIN — HYDROMORPHONE HYDROCHLORIDE 0.5 MG: 1 INJECTION, SOLUTION INTRAMUSCULAR; INTRAVENOUS; SUBCUTANEOUS at 09:22

## 2019-12-29 RX ADMIN — OXYCODONE HYDROCHLORIDE AND ACETAMINOPHEN 1 TABLET: 10; 325 TABLET ORAL at 04:23

## 2019-12-29 RX ADMIN — HYDROMORPHONE HYDROCHLORIDE 0.5 MG: 1 INJECTION, SOLUTION INTRAMUSCULAR; INTRAVENOUS; SUBCUTANEOUS at 02:10

## 2019-12-29 RX ADMIN — IBUPROFEN 800 MG: 200 TABLET, FILM COATED ORAL at 08:33

## 2019-12-29 RX ADMIN — HYDROMORPHONE HYDROCHLORIDE 0.5 MG: 1 INJECTION, SOLUTION INTRAMUSCULAR; INTRAVENOUS; SUBCUTANEOUS at 17:22

## 2019-12-29 RX ADMIN — HYDROMORPHONE HYDROCHLORIDE 0.5 MG: 1 INJECTION, SOLUTION INTRAMUSCULAR; INTRAVENOUS; SUBCUTANEOUS at 21:27

## 2019-12-29 RX ADMIN — OXYCODONE HYDROCHLORIDE AND ACETAMINOPHEN 1 TABLET: 10; 325 TABLET ORAL at 23:12

## 2019-12-29 RX ADMIN — IBUPROFEN 800 MG: 200 TABLET, FILM COATED ORAL at 02:10

## 2019-12-29 RX ADMIN — OXYCODONE HYDROCHLORIDE AND ACETAMINOPHEN 1 TABLET: 10; 325 TABLET ORAL at 16:21

## 2019-12-29 RX ADMIN — HYDROMORPHONE HYDROCHLORIDE 0.5 MG: 1 INJECTION, SOLUTION INTRAMUSCULAR; INTRAVENOUS; SUBCUTANEOUS at 05:40

## 2019-12-29 NOTE — PROGRESS NOTES
"   Orthopaedic PA Progress Note    Interval changes: Pain better today, thinks she will have control with orals by tomorrow Morning - DC orders initiated. Wants stool softener and home Estrace. Dilaudid frequency decreased.    ROS - Patient denies any new issues. No chest pain, dyspnea, or fever.  Pain well controlled.    /63   Pulse 60   Temp 36.4 °C (97.6 °F) (Temporal)   Resp 18   Ht 1.626 m (5' 4\")   Wt 71.6 kg (157 lb 13.6 oz)   SpO2 98%     Patient seen and examined  No acute distress  Breathing non labored  RRR  Prox compartments soft  Splint intact  Toes DF/PF/SILT/ICR      Assessment/Plan:  POD#2 S/P  1.  Left Achilles debridement including calcifications.  2.  Left Achilles lengthening, open Z lengthening.  3.  Left posterior ankle capsular release.  4.  Left ankle manipulation.  5.  Left ankle arthrotomy with synovectomy, anterior.  6.  Left partial excision distal tibia for bossing.  7.  Left partial excision dorsal anterior talus for bossing.  8.  Left plantar fifth metatarsal base excision.  Wt bearing status - NWB LLE below knee  PT/OT-initiated  Wound care:splint   Drains - no  Doherty-no  Sutures/Staples out- 10-14 days post operatively  Antibiotics: complete  DVT Prophylaxis- TEDS/SCDs/Foot pumps.   Lovenox: 40 mg daily Duration-until ambulatory > 150'  Future Procedures - not planned  Case Coordination for Discharge Planning - Disposition Home in AM  DC orders initiated        "

## 2019-12-29 NOTE — CARE PLAN
Problem: Knowledge Deficit  Goal: Knowledge of disease process/condition, treatment plan, diagnostic tests, and medications will improve  Outcome: PROGRESSING AS EXPECTED    Discussed plan of care for today w/ pt this am, she is A+O, she verbalizes understanding of her plan of care, questions answered. Emotional support given. Reinforcing education as necessary. Wing Bagley trauma PA in, discussed pt's care with him. Anticipating DC tomorrow am.        Problem: Pain Management  Goal: Pain level will decrease to patient's comfort goal  Outcome: PROGRESSING AS EXPECTED    Assessing every four hrs for pain per protocol; see doc flowsheets, receiving medication for pain control - See MAR.

## 2019-12-30 VITALS
BODY MASS INDEX: 26.95 KG/M2 | RESPIRATION RATE: 18 BRPM | TEMPERATURE: 97.8 F | HEIGHT: 64 IN | WEIGHT: 157.85 LBS | OXYGEN SATURATION: 98 % | DIASTOLIC BLOOD PRESSURE: 65 MMHG | HEART RATE: 70 BPM | SYSTOLIC BLOOD PRESSURE: 111 MMHG

## 2019-12-30 PROCEDURE — A9270 NON-COVERED ITEM OR SERVICE: HCPCS | Performed by: ORTHOPAEDIC SURGERY

## 2019-12-30 PROCEDURE — A9270 NON-COVERED ITEM OR SERVICE: HCPCS | Performed by: PHYSICIAN ASSISTANT

## 2019-12-30 PROCEDURE — 96372 THER/PROPH/DIAG INJ SC/IM: CPT

## 2019-12-30 PROCEDURE — 700102 HCHG RX REV CODE 250 W/ 637 OVERRIDE(OP): Performed by: PHYSICIAN ASSISTANT

## 2019-12-30 PROCEDURE — G0378 HOSPITAL OBSERVATION PER HR: HCPCS

## 2019-12-30 PROCEDURE — 700102 HCHG RX REV CODE 250 W/ 637 OVERRIDE(OP): Performed by: ORTHOPAEDIC SURGERY

## 2019-12-30 PROCEDURE — 700111 HCHG RX REV CODE 636 W/ 250 OVERRIDE (IP): Performed by: ORTHOPAEDIC SURGERY

## 2019-12-30 PROCEDURE — 700111 HCHG RX REV CODE 636 W/ 250 OVERRIDE (IP): Performed by: PHYSICIAN ASSISTANT

## 2019-12-30 RX ADMIN — OXYCODONE HYDROCHLORIDE AND ACETAMINOPHEN 1 TABLET: 10; 325 TABLET ORAL at 04:06

## 2019-12-30 RX ADMIN — HYDROMORPHONE HYDROCHLORIDE 0.5 MG: 1 INJECTION, SOLUTION INTRAMUSCULAR; INTRAVENOUS; SUBCUTANEOUS at 06:34

## 2019-12-30 RX ADMIN — IBUPROFEN 800 MG: 200 TABLET, FILM COATED ORAL at 04:06

## 2019-12-30 RX ADMIN — ENOXAPARIN SODIUM 40 MG: 100 INJECTION SUBCUTANEOUS at 09:47

## 2019-12-30 RX ADMIN — ESTRADIOL 0.5 MG: 1 TABLET ORAL at 06:33

## 2019-12-30 RX ADMIN — ZOLPIDEM TARTRATE 10 MG: 5 TABLET ORAL at 00:06

## 2019-12-30 RX ADMIN — OXYCODONE HYDROCHLORIDE AND ACETAMINOPHEN 1 TABLET: 5; 325 TABLET ORAL at 11:07

## 2019-12-30 RX ADMIN — OXYCODONE HYDROCHLORIDE AND ACETAMINOPHEN 1 TABLET: 10; 325 TABLET ORAL at 09:43

## 2019-12-30 NOTE — DISCHARGE SUMMARY
Discharge Summary    CHIEF COMPLAINT ON ADMISSION  Left foot equins contracture    Reason for Admission  PAIN IN LEFT FOOT     Admission Date  12/27/2019    CODE STATUS  Full Code    HPI & HOSPITAL COURSE  This is a 54 y.o. female here  For elective surgery on her left foot.  She underwent Achilles debridement, lateral metatarsal exostectomy, capsular release.  She did well from surgery and remained due to pain control.     Therefore, she is discharged in good and stable condition to home with close outpatient follow-up.    The patient met 2-midnight criteria for an inpatient stay at the time of discharge.    Discharge Date  12/30/19    FOLLOW UP ITEMS POST DISCHARGE  F/u per schedule    DISCHARGE DIAGNOSES  Active Problems:    Postoperative pain POA: No  Resolved Problems:    * No resolved hospital problems. *      FOLLOW UP  No future appointments.  Home Freeman M.D.  [address] [facility] [address]  [phone]    In 10 days  For wound re-check      MEDICATIONS ON DISCHARGE     Medication List      START taking these medications      Instructions   docusate sodium 100 MG Caps   Take 100 mg by mouth 2 times a day as needed for Constipation.  Dose:  100 mg     gabapentin 300 MG Caps  Commonly known as:  NEURONTIN   Take 1 Cap by mouth every evening.  Dose:  300 mg     ibuprofen 800 MG Tabs  Commonly known as:  MOTRIN   Take 1 Tab by mouth 3 times a day.  Dose:  800 mg     oxyCODONE-acetaminophen  MG Tabs  Commonly known as:  PERCOCET-10  Replaces:  oxyCODONE-acetaminophen 7.5-325 MG per tablet   Take 1 Tab by mouth every four hours as needed for up to 7 days.  Dose:  1 Tab        CONTINUE taking these medications      Instructions   ADDERALL (20MG) 20 MG Tabs  Generic drug:  amphetamine-dextroamphetamine   Take 20 mg by mouth every day.  Dose:  20 mg     AMBIEN 10 MG Tabs  Generic drug:  zolpidem   Take 10 mg by mouth at bedtime as needed for Sleep.  Dose:  10 mg     estradiol 0.5 MG  "tablet  Commonly known as:  ESTRACE   TAKE 1 TABLET BY MOUTH EVERY DAY-PT NEEDS APPT     fluticasone 50 MCG/ACT nasal spray  Commonly known as:  FLONASE   Spray 1 Spray in nose every day.  Dose:  1 Spray     LUBRICANT EYE DROPS OP   Place 2 Drops in both eyes as needed.  Dose:  2 Drop     VENTOLIN  (90 Base) MCG/ACT Aers inhalation aerosol  Generic drug:  albuterol   Inhale 1-2 Puffs by mouth every four hours as needed.  Dose:  1-2 Puff     Vitamin D3 5000 units Chew   Take 5,000 Units by mouth every bedtime.  Dose:  5,000 Units     VOLTAREN 1 % Gel  Generic drug:  Diclofenac Sodium   Apply 1 Application to skin as directed as needed.  Dose:  1 Application        STOP taking these medications    oxyCODONE-acetaminophen 7.5-325 MG per tablet  Commonly known as:  PERCOCET  Replaced by:  oxyCODONE-acetaminophen  MG Tabs            Allergies  Allergies   Allergen Reactions   • Sulfa Drugs      Blood blisters   • Codeine Vomiting   • Dilaudid [Hydromorphone Hcl]      \"It doesn't make me feel good\"   • Levaquin Hives   • Tape Rash     Regular tape ok everywhere except none on legs  PAPER TAPE OK   • Tramadol Hcl Unspecified     \"dizzy and fainting\"       DIET  Orders Placed This Encounter   Procedures   • Diet Order Regular     Standing Status:   Standing     Number of Occurrences:   1     Order Specific Question:   Diet:     Answer:   Regular [1]       ACTIVITY  As tolerated.  Non-weight bearing LEFT leg    CONSULTATIONS  none    PROCEDURES  See op report    LABORATORY  Lab Results   Component Value Date    SODIUM 136 03/10/2017    POTASSIUM 4.3 03/10/2017    CHLORIDE 101 03/10/2017    CO2 26 03/10/2017    GLUCOSE 89 03/10/2017    BUN 15 03/10/2017    CREATININE 0.9 03/10/2017        Lab Results   Component Value Date    WBC 5.3 03/10/2017    HEMOGLOBIN 13.8 03/10/2017    HEMATOCRIT 40.7 03/10/2017    PLATELETCT 280 03/10/2017        Veronique Oviedo Md, PhD  [facility]  Foot and Ankle Fellow  [phone] " [phone]

## 2019-12-30 NOTE — PROGRESS NOTES
[facility] - Foot and Ankle Service    POD#3    MARCELINA.  Patient denies chest pain, SOB, and dizziness.  Patient is tolerating po intake. Her pain control is improving.    Vitals:    12/29/19 2100 12/29/19 2300 12/30/19 0414 12/30/19 0627   BP: 111/75 118/76 109/72 100/64   Pulse: 65 62 66 (!) 56   Resp: 16 16 17    Temp: 36.1 °C (97 °F)  36.4 °C (97.6 °F)    TempSrc: Temporal  Temporal    SpO2: 99% 100% 98%    Weight:       Height:           Lab Results   Component Value Date/Time    WBC 5.3 03/10/2017 02:10 PM    RBC 4.64 03/10/2017 02:10 PM    HEMOGLOBIN 13.8 03/10/2017 02:10 PM    HEMATOCRIT 40.7 03/10/2017 02:10 PM    MCV 87.7 03/10/2017 02:10 PM    MCH 29.7 03/10/2017 02:10 PM    MCHC 33.9 03/10/2017 02:10 PM    MPV 10.0 03/10/2017 02:10 PM    NEUTSPOLYS 41.9 (L) 07/23/2008 10:50 AM    LYMPHOCYTES 49.3 (H) 07/23/2008 10:50 AM    MONOCYTES 4.3 07/23/2008 10:50 AM    EOSINOPHILS 2.2 07/23/2008 10:50 AM    BASOPHILS 2.3 (H) 07/23/2008 10:50 AM        Lab Results   Component Value Date/Time    SODIUM 136 03/10/2017 02:10 PM    POTASSIUM 4.3 03/10/2017 02:10 PM    CHLORIDE 101 03/10/2017 02:10 PM    CO2 26 03/10/2017 02:10 PM    GLUCOSE 89 03/10/2017 02:10 PM    BUN 15 03/10/2017 02:10 PM    CREATININE 0.9 03/10/2017 02:10 PM        Lab Results   Component Value Date/Time    PROTHROMBTM 10.0 09/28/2016 02:45 PM    INR 0.96 09/28/2016 02:45 PM        Results     ** No results found for the last 168 hours. **          Physical Exam:    LLE: left leg in splint.  Toes WWP.  Able to flex/extend toes without difficulty.    A&P: 53 yo F w/hx of multiple left foot surgeries now s/p achilles lengthening, capsular release and lateral exostectomy on 12/27  1)  Pain control  2) NWB LLE  3) Keep splint on  4) PT  5) Okay for D/C when patient feels comfortable (possibly today)    Veronique Oviedo Md, PhD  [facility]  Foot and Ankle Fellow  [phone]

## 2019-12-30 NOTE — PROGRESS NOTES
Patient ready for discharge. AVS reviewed with patient. Patient signs and understands. Pain medication administered. Patient taken to her car via wheelchair with CNA and family. Left the floor at 1115.

## 2019-12-30 NOTE — DISCHARGE INSTRUCTIONS
Achilles Tendon Repair, Care After  Refer to this sheet in the next few weeks. These instructions provide you with information on caring for yourself after your procedure. Your health care provider may also give you more specific instructions. Your treatment has been planned according to current medical practices, but problems sometimes occur. Call your health care provider if you have any problems or questions after your procedure.  WHAT TO EXPECT AFTER THE PROCEDURE  · Your lower leg may feel numb for several hours.  · You may feel pain when the numbing medicine wears off.  · Most people can start to walk normally in about 6 weeks. It may be 6 months before you can do all your regular activities. Complete recovery can take a year or longer.  HOME CARE INSTRUCTIONS  · Take pain medicines as directed by your health care provider.  · Do not take over-the-counter medicines unless your health care provider says it is okay.  · Do not walk on or put weight on your injured leg.  · Keep your cast or splint dry while bathing.  · Use crutches or another walking aid.  · When you are resting, keep your leg above the level of your heart.  · Go back to your health care provider about 2 weeks after surgery to have your splint or cast removed. If you have stitches, your health care provider will also take them out during this time.  · After your cast or splint is removed, your health care provider will give you instructions on how to move your ankle and how much weight you can put on your leg. Follow your health care provider's instructions.  · See a physical therapist if directed by your health care provider. A physical therapist can help you regain ankle motion and strengthen your leg muscles.  · Keep all follow-up appointments.  SEEK MEDICAL CARE IF:   · You have chills.  · You have a fever.  · Your pain medicine is not working.  · You have persistent numbness or burning.  SEEK IMMEDIATE MEDICAL CARE IF:   · You have pain  or numbness that is getting worse.  · You are unable to move your toes.  · You are bleeding through your cast or splint.  · You notice redness, swelling, bleeding, or discharge near your cut (incision) after your cast or splint has been removed.  · You notice warmth, swelling, or tenderness in the back of your lower leg.  · You have chest pain.  · You have trouble breathing.  MAKE SURE YOU:  · Understand these instructions.  · Will watch your condition.  · Will get help right away if you are not doing well or get worse.     This information is not intended to replace advice given to you by your health care provider. Make sure you discuss any questions you have with your health care provider.     Document Released: 08/23/2005 Document Revised: 12/23/2014 Document Reviewed: 11/03/2014  [employer] Interactive Patient Education ©2016 [employer]      Discharge Instructions    Discharged to home by car with relative. Discharged via wheelchair, hospital escort: Yes.  Special equipment needed: Crutches      Be sure to schedule a follow-up appointment with your primary care doctor or any specialists as instructed.     Discharge Plan:   Influenza Vaccine Indication: Patient Refuses    I understand that a diet low in cholesterol, fat, and sodium is recommended for good health. Unless I have been given specific instructions below for another diet, I accept this instruction as my diet prescription.   Other diet: Regular Home Diet    Special Instructions: None    Is patient discharged on Warfarin / Coumadin?   No     Depression / Suicide Risk    As you are discharged from this [facility] facility, it is important to learn how to keep safe from harming yourself.    Recognize the warning signs:  Abrupt changes in personality, positive or negative- including increase in energy   Giving away possessions  Change in eating patterns- significant weight changes-  positive or negative  Change in sleeping patterns- unable to sleep or  sleeping all the time   Unwillingness or inability to communicate  Depression  Unusual sadness, discouragement and loneliness  Talk of wanting to die  Neglect of personal appearance   Rebelliousness- reckless behavior  Withdrawal from people/activities they love  Confusion- inability to concentrate     If you or a loved one observes any of these behaviors or has concerns about self-harm, here's what you can do:  Talk about it- your feelings and reasons for harming yourself  Remove any means that you might use to hurt yourself (examples: pills, rope, extension cords, firearm)  Get professional help from the community (Mental Health, Substance Abuse, psychological counseling)  Do not be alone:Call your Safe Contact- someone whom you trust who will be there for you.  Call your local CRISIS HOTLINE 113-3310 or 585-445-3748  Call your local Children's Mobile Crisis Response Team [place] (788) 109-8911 or www.[employer]  Call the toll free National Suicide Prevention Hotlines   National Suicide Prevention Lifeline 078-098-TSDP (9212)  National Hope Line Network 800-SUICIDE (765-1767)      -Continue Non weight bearing to LLE  -Shower with splint covered with trash bag  -Do not remove splint, will be checked at follow up appointment, do not soak LLE, No swimming, baths

## 2019-12-30 NOTE — CARE PLAN
Patient is up on her own within her room with the use of a front wheeled walker. Patient is steady.

## 2019-12-30 NOTE — CARE PLAN
Patient complaining of pain rated at 9/10.  Patient medicated with Dilaudid and Percocet. Ice also applied.

## 2019-12-30 NOTE — PROGRESS NOTES
"In to give docusate po and estrace po tonight to pt, informed and educated her concerning [facility] trauma PA's change to her IV PRN dilaudid order to Q 8 hrs PRN. Pt says: \"Then why should I even stay here if I am not getting my break through pain medication??\" Discussed this w/ pt. Emotional support given. Pt would like to speak with [facility]. Called [facility] per her request. No order to change back medication obtained. Discussed pt's care with SW. Discussed pt's care with charge RN. Charge RN in to see pt and speak to her. Called Dr. Freeman's office per charge RN's recommendation. Spoke with DIONY Johansen. Informed him of situation. Order received to change back dilaudid IV PRN order to Q 3 hrs PRN. Updated pt. Pt decides she doesn't need to discharge tonight. Updated charge RN regarding situation.   "

## 2019-12-30 NOTE — CARE PLAN
Problem: Communication  Goal: The ability to communicate needs accurately and effectively will improve  Outcome: MET     Problem: Safety  Goal: Will remain free from injury  Outcome: MET  Goal: Will remain free from falls  Outcome: MET     Problem: Infection  Goal: Will remain free from infection  Outcome: MET     Problem: Venous Thromboembolism (VTW)/Deep Vein Thrombosis (DVT) Prevention:  Goal: Patient will participate in Venous Thrombosis (VTE)/Deep Vein Thrombosis (DVT)Prevention Measures  Outcome: MET  Note:   Educated on DVT prevention post discharge. Ortho didn't Discharge with DVT prophylactic.      Problem: Bowel/Gastric:  Goal: Normal bowel function is maintained or improved  Outcome: MET  Goal: Will not experience complications related to bowel motility  Outcome: MET     Problem: Knowledge Deficit  Goal: Knowledge of disease process/condition, treatment plan, diagnostic tests, and medications will improve  Outcome: MET  Goal: Knowledge of the prescribed therapeutic regimen will improve  Outcome: MET     Problem: Discharge Barriers/Planning  Goal: Patient's continuum of care needs will be met  Outcome: MET  Note:   Discussed AVS with patient. Patient ride came to pick her up. MD's ok for discharge.      Problem: Pain Management  Goal: Pain level will decrease to patient's comfort goal  Outcome: MET  Note:   Patient took her PRN percocet for long car ride home. Discharged on Percocet for home.      Problem: Psychosocial Needs:  Goal: Level of anxiety will decrease  Outcome: MET     Problem: Respiratory:  Goal: Respiratory status will improve  Outcome: MET

## 2020-02-17 ENCOUNTER — ANESTHESIA EVENT (OUTPATIENT)
Dept: SURGERY | Facility: MEDICAL CENTER | Age: 55
End: 2020-02-17
Payer: MEDICARE

## 2020-02-17 ENCOUNTER — HOSPITAL ENCOUNTER (OUTPATIENT)
Facility: MEDICAL CENTER | Age: 55
End: 2020-02-17
Attending: ORTHOPAEDIC SURGERY | Admitting: ORTHOPAEDIC SURGERY
Payer: MEDICARE

## 2020-02-17 ENCOUNTER — ANESTHESIA (OUTPATIENT)
Dept: SURGERY | Facility: MEDICAL CENTER | Age: 55
End: 2020-02-17
Payer: MEDICARE

## 2020-02-17 VITALS
OXYGEN SATURATION: 99 % | HEIGHT: 65 IN | SYSTOLIC BLOOD PRESSURE: 136 MMHG | WEIGHT: 160.5 LBS | HEART RATE: 68 BPM | TEMPERATURE: 97.3 F | RESPIRATION RATE: 16 BRPM | BODY MASS INDEX: 26.74 KG/M2 | DIASTOLIC BLOOD PRESSURE: 58 MMHG

## 2020-02-17 PROCEDURE — 700111 HCHG RX REV CODE 636 W/ 250 OVERRIDE (IP): Performed by: ANESTHESIOLOGY

## 2020-02-17 PROCEDURE — 87070 CULTURE OTHR SPECIMN AEROBIC: CPT

## 2020-02-17 PROCEDURE — 160025 RECOVERY II MINUTES (STATS): Performed by: ORTHOPAEDIC SURGERY

## 2020-02-17 PROCEDURE — 503339 HCHG DRESSSING PICO: Performed by: ORTHOPAEDIC SURGERY

## 2020-02-17 PROCEDURE — 500423 HCHG DRESSING, ABD COMBINE: Performed by: ORTHOPAEDIC SURGERY

## 2020-02-17 PROCEDURE — 700101 HCHG RX REV CODE 250: Performed by: ANESTHESIOLOGY

## 2020-02-17 PROCEDURE — 501838 HCHG SUTURE GENERAL: Performed by: ORTHOPAEDIC SURGERY

## 2020-02-17 PROCEDURE — 700105 HCHG RX REV CODE 258: Performed by: ORTHOPAEDIC SURGERY

## 2020-02-17 PROCEDURE — 160035 HCHG PACU - 1ST 60 MINS PHASE I: Performed by: ORTHOPAEDIC SURGERY

## 2020-02-17 PROCEDURE — 87015 SPECIMEN INFECT AGNT CONCNTJ: CPT

## 2020-02-17 PROCEDURE — A9270 NON-COVERED ITEM OR SERVICE: HCPCS | Performed by: ANESTHESIOLOGY

## 2020-02-17 PROCEDURE — 160009 HCHG ANES TIME/MIN: Performed by: ORTHOPAEDIC SURGERY

## 2020-02-17 PROCEDURE — A6223 GAUZE >16<=48 NO W/SAL W/O B: HCPCS | Performed by: ORTHOPAEDIC SURGERY

## 2020-02-17 PROCEDURE — 501330 HCHG SET, CYSTO IRRIG TUBING: Performed by: ORTHOPAEDIC SURGERY

## 2020-02-17 PROCEDURE — 87205 SMEAR GRAM STAIN: CPT

## 2020-02-17 PROCEDURE — 160046 HCHG PACU - 1ST 60 MINS PHASE II: Performed by: ORTHOPAEDIC SURGERY

## 2020-02-17 PROCEDURE — 160002 HCHG RECOVERY MINUTES (STAT): Performed by: ORTHOPAEDIC SURGERY

## 2020-02-17 PROCEDURE — 160048 HCHG OR STATISTICAL LEVEL 1-5: Performed by: ORTHOPAEDIC SURGERY

## 2020-02-17 PROCEDURE — 700102 HCHG RX REV CODE 250 W/ 637 OVERRIDE(OP): Performed by: ANESTHESIOLOGY

## 2020-02-17 PROCEDURE — 87075 CULTR BACTERIA EXCEPT BLOOD: CPT

## 2020-02-17 PROCEDURE — 160028 HCHG SURGERY MINUTES - 1ST 30 MINS LEVEL 3: Performed by: ORTHOPAEDIC SURGERY

## 2020-02-17 PROCEDURE — 700101 HCHG RX REV CODE 250: Performed by: ORTHOPAEDIC SURGERY

## 2020-02-17 PROCEDURE — A6454 SELF-ADHER BAND W>=3" <5"/YD: HCPCS | Performed by: ORTHOPAEDIC SURGERY

## 2020-02-17 PROCEDURE — 500881 HCHG PACK, EXTREMITY: Performed by: ORTHOPAEDIC SURGERY

## 2020-02-17 PROCEDURE — 700111 HCHG RX REV CODE 636 W/ 250 OVERRIDE (IP)

## 2020-02-17 PROCEDURE — 160039 HCHG SURGERY MINUTES - EA ADDL 1 MIN LEVEL 3: Performed by: ORTHOPAEDIC SURGERY

## 2020-02-17 RX ORDER — MORPHINE SULFATE 10 MG/ML
5 INJECTION, SOLUTION INTRAMUSCULAR; INTRAVENOUS
Status: DISCONTINUED | OUTPATIENT
Start: 2020-02-17 | End: 2020-02-17 | Stop reason: HOSPADM

## 2020-02-17 RX ORDER — DIPHENHYDRAMINE HYDROCHLORIDE 50 MG/ML
12.5 INJECTION INTRAMUSCULAR; INTRAVENOUS
Status: DISCONTINUED | OUTPATIENT
Start: 2020-02-17 | End: 2020-02-17 | Stop reason: HOSPADM

## 2020-02-17 RX ORDER — SODIUM CHLORIDE, SODIUM LACTATE, POTASSIUM CHLORIDE, CALCIUM CHLORIDE 600; 310; 30; 20 MG/100ML; MG/100ML; MG/100ML; MG/100ML
INJECTION, SOLUTION INTRAVENOUS CONTINUOUS
Status: DISCONTINUED | OUTPATIENT
Start: 2020-02-17 | End: 2020-02-17 | Stop reason: HOSPADM

## 2020-02-17 RX ORDER — ACETAMINOPHEN 500 MG
1000 TABLET ORAL ONCE
Status: COMPLETED | OUTPATIENT
Start: 2020-02-17 | End: 2020-02-17

## 2020-02-17 RX ORDER — BUPIVACAINE HYDROCHLORIDE 5 MG/ML
INJECTION, SOLUTION EPIDURAL; INTRACAUDAL
Status: DISCONTINUED | OUTPATIENT
Start: 2020-02-17 | End: 2020-02-17 | Stop reason: HOSPADM

## 2020-02-17 RX ORDER — CEFAZOLIN SODIUM 1 G/3ML
INJECTION, POWDER, FOR SOLUTION INTRAMUSCULAR; INTRAVENOUS PRN
Status: DISCONTINUED | OUTPATIENT
Start: 2020-02-17 | End: 2020-02-17 | Stop reason: SURG

## 2020-02-17 RX ORDER — HYDRALAZINE HYDROCHLORIDE 20 MG/ML
5 INJECTION INTRAMUSCULAR; INTRAVENOUS
Status: DISCONTINUED | OUTPATIENT
Start: 2020-02-17 | End: 2020-02-17 | Stop reason: HOSPADM

## 2020-02-17 RX ORDER — ONDANSETRON 2 MG/ML
4 INJECTION INTRAMUSCULAR; INTRAVENOUS
Status: COMPLETED | OUTPATIENT
Start: 2020-02-17 | End: 2020-02-17

## 2020-02-17 RX ORDER — MEPERIDINE HYDROCHLORIDE 25 MG/ML
6.25 INJECTION INTRAMUSCULAR; INTRAVENOUS; SUBCUTANEOUS
Status: DISCONTINUED | OUTPATIENT
Start: 2020-02-17 | End: 2020-02-17 | Stop reason: HOSPADM

## 2020-02-17 RX ORDER — OXYCODONE HCL 5 MG/5 ML
10 SOLUTION, ORAL ORAL
Status: COMPLETED | OUTPATIENT
Start: 2020-02-17 | End: 2020-02-17

## 2020-02-17 RX ORDER — MORPHINE SULFATE 10 MG/ML
2 INJECTION, SOLUTION INTRAMUSCULAR; INTRAVENOUS
Status: DISCONTINUED | OUTPATIENT
Start: 2020-02-17 | End: 2020-02-17 | Stop reason: HOSPADM

## 2020-02-17 RX ORDER — OXYCODONE AND ACETAMINOPHEN 7.5; 325 MG/1; MG/1
1 TABLET ORAL 4 TIMES DAILY
COMMUNITY
Start: 2020-01-30

## 2020-02-17 RX ORDER — DOCUSATE SODIUM 100 MG/1
100 CAPSULE, LIQUID FILLED ORAL
COMMUNITY
End: 2022-05-26

## 2020-02-17 RX ORDER — DEXTROAMPHETAMINE SACCHARATE, AMPHETAMINE ASPARTATE, DEXTROAMPHETAMINE SULFATE AND AMPHETAMINE SULFATE 7.5; 7.5; 7.5; 7.5 MG/1; MG/1; MG/1; MG/1
30 TABLET ORAL DAILY
COMMUNITY
End: 2022-04-26

## 2020-02-17 RX ORDER — LIDOCAINE HYDROCHLORIDE 20 MG/ML
INJECTION, SOLUTION EPIDURAL; INFILTRATION; INTRACAUDAL; PERINEURAL PRN
Status: DISCONTINUED | OUTPATIENT
Start: 2020-02-17 | End: 2020-02-17 | Stop reason: SURG

## 2020-02-17 RX ORDER — OXYCODONE HCL 5 MG/5 ML
5 SOLUTION, ORAL ORAL
Status: COMPLETED | OUTPATIENT
Start: 2020-02-17 | End: 2020-02-17

## 2020-02-17 RX ORDER — MIDAZOLAM HYDROCHLORIDE 1 MG/ML
INJECTION INTRAMUSCULAR; INTRAVENOUS PRN
Status: DISCONTINUED | OUTPATIENT
Start: 2020-02-17 | End: 2020-02-17 | Stop reason: SURG

## 2020-02-17 RX ORDER — LIDOCAINE HYDROCHLORIDE 10 MG/ML
INJECTION, SOLUTION EPIDURAL; INFILTRATION; INTRACAUDAL; PERINEURAL
Status: COMPLETED
Start: 2020-02-17 | End: 2020-02-17

## 2020-02-17 RX ORDER — IBUPROFEN 800 MG/1
800 TABLET ORAL EVERY 8 HOURS PRN
COMMUNITY
End: 2021-01-28

## 2020-02-17 RX ORDER — HALOPERIDOL 5 MG/ML
1 INJECTION INTRAMUSCULAR
Status: DISCONTINUED | OUTPATIENT
Start: 2020-02-17 | End: 2020-02-17 | Stop reason: HOSPADM

## 2020-02-17 RX ORDER — DEXAMETHASONE SODIUM PHOSPHATE 4 MG/ML
INJECTION, SOLUTION INTRA-ARTICULAR; INTRALESIONAL; INTRAMUSCULAR; INTRAVENOUS; SOFT TISSUE PRN
Status: DISCONTINUED | OUTPATIENT
Start: 2020-02-17 | End: 2020-02-17 | Stop reason: SURG

## 2020-02-17 RX ORDER — MORPHINE SULFATE 10 MG/ML
1 INJECTION, SOLUTION INTRAMUSCULAR; INTRAVENOUS
Status: DISCONTINUED | OUTPATIENT
Start: 2020-02-17 | End: 2020-02-17 | Stop reason: HOSPADM

## 2020-02-17 RX ORDER — ONDANSETRON 2 MG/ML
INJECTION INTRAMUSCULAR; INTRAVENOUS PRN
Status: DISCONTINUED | OUTPATIENT
Start: 2020-02-17 | End: 2020-02-17 | Stop reason: SURG

## 2020-02-17 RX ORDER — LABETALOL HYDROCHLORIDE 5 MG/ML
5 INJECTION, SOLUTION INTRAVENOUS
Status: DISCONTINUED | OUTPATIENT
Start: 2020-02-17 | End: 2020-02-17 | Stop reason: HOSPADM

## 2020-02-17 RX ORDER — MAGNESIUM HYDROXIDE 1200 MG/15ML
LIQUID ORAL
Status: COMPLETED | OUTPATIENT
Start: 2020-02-17 | End: 2020-02-17

## 2020-02-17 RX ORDER — ESTRADIOL 0.5 MG/1
0.5 TABLET ORAL EVERY EVENING
COMMUNITY
End: 2020-10-02

## 2020-02-17 RX ADMIN — FENTANYL CITRATE 50 MCG: 0.05 INJECTION, SOLUTION INTRAMUSCULAR; INTRAVENOUS at 16:12

## 2020-02-17 RX ADMIN — FENTANYL CITRATE 25 MCG: 50 INJECTION, SOLUTION INTRAMUSCULAR; INTRAVENOUS at 15:09

## 2020-02-17 RX ADMIN — FENTANYL CITRATE 50 MCG: 0.05 INJECTION, SOLUTION INTRAMUSCULAR; INTRAVENOUS at 15:58

## 2020-02-17 RX ADMIN — CEFAZOLIN 2 G: 330 INJECTION, POWDER, FOR SOLUTION INTRAMUSCULAR; INTRAVENOUS at 14:51

## 2020-02-17 RX ADMIN — PROPOFOL 200 MG: 10 INJECTION, EMULSION INTRAVENOUS at 14:48

## 2020-02-17 RX ADMIN — FENTANYL CITRATE 25 MCG: 50 INJECTION, SOLUTION INTRAMUSCULAR; INTRAVENOUS at 15:07

## 2020-02-17 RX ADMIN — FENTANYL CITRATE 50 MCG: 0.05 INJECTION, SOLUTION INTRAMUSCULAR; INTRAVENOUS at 15:48

## 2020-02-17 RX ADMIN — FENTANYL CITRATE 25 MCG: 50 INJECTION, SOLUTION INTRAMUSCULAR; INTRAVENOUS at 14:51

## 2020-02-17 RX ADMIN — MIDAZOLAM HYDROCHLORIDE 2 MG: 1 INJECTION, SOLUTION INTRAMUSCULAR; INTRAVENOUS at 14:43

## 2020-02-17 RX ADMIN — SODIUM CHLORIDE, POTASSIUM CHLORIDE, SODIUM LACTATE AND CALCIUM CHLORIDE: 600; 310; 30; 20 INJECTION, SOLUTION INTRAVENOUS at 12:52

## 2020-02-17 RX ADMIN — LIDOCAINE HYDROCHLORIDE 0.5 ML: 10 INJECTION, SOLUTION EPIDURAL; INFILTRATION; INTRACAUDAL; PERINEURAL at 12:50

## 2020-02-17 RX ADMIN — FENTANYL CITRATE 50 MCG: 50 INJECTION, SOLUTION INTRAMUSCULAR; INTRAVENOUS at 15:00

## 2020-02-17 RX ADMIN — ONDANSETRON 4 MG: 2 INJECTION INTRAMUSCULAR; INTRAVENOUS at 14:51

## 2020-02-17 RX ADMIN — ONDANSETRON 4 MG: 2 INJECTION INTRAMUSCULAR; INTRAVENOUS at 15:39

## 2020-02-17 RX ADMIN — FENTANYL CITRATE 50 MCG: 0.05 INJECTION, SOLUTION INTRAMUSCULAR; INTRAVENOUS at 16:58

## 2020-02-17 RX ADMIN — LIDOCAINE HYDROCHLORIDE 50 MG: 20 INJECTION, SOLUTION EPIDURAL; INFILTRATION; INTRACAUDAL at 14:48

## 2020-02-17 RX ADMIN — DEXAMETHASONE SODIUM PHOSPHATE 8 MG: 4 INJECTION, SOLUTION INTRA-ARTICULAR; INTRALESIONAL; INTRAMUSCULAR; INTRAVENOUS; SOFT TISSUE at 14:51

## 2020-02-17 RX ADMIN — Medication 0.5 ML: at 12:50

## 2020-02-17 RX ADMIN — FENTANYL CITRATE 50 MCG: 0.05 INJECTION, SOLUTION INTRAMUSCULAR; INTRAVENOUS at 15:42

## 2020-02-17 RX ADMIN — OXYCODONE HYDROCHLORIDE 10 MG: 5 SOLUTION ORAL at 15:40

## 2020-02-17 RX ADMIN — ACETAMINOPHEN 1000 MG: 500 TABLET ORAL at 12:50

## 2020-02-17 ASSESSMENT — PAIN SCALES - GENERAL: PAIN_LEVEL: 5

## 2020-02-17 NOTE — ANESTHESIA PROCEDURE NOTES
Airway  Date/Time: 2/17/2020 2:48 PM  Performed by: Fabian Townsend M.D.  Authorized by: Fabian Townsend M.D.     Location:  OR  Urgency:  Elective  Indications for Airway Management:  Anesthesia  Spontaneous Ventilation: absent    Sedation Level:  Deep  Preoxygenated: Yes    Final Airway Type:  Supraglottic airway  Final Supraglottic Airway:  Standard LMA  SGA Size:  4  Number of Attempts at Approach:  1

## 2020-02-17 NOTE — ANESTHESIA TIME REPORT
Anesthesia Start and Stop Event Times     Date Time Event    2/17/2020 1436 Ready for Procedure     1443 Anesthesia Start     1529 Anesthesia Stop        Responsible Staff  02/17/20    Name Role Begin End    Fabian Townsend M.D. Anesth 1443 1529        Preop Diagnosis (Free Text):  Pre-op Diagnosis     left achilles surgical wound dehisence, left 5th toe contracture        Preop Diagnosis (Codes):    Post op Diagnosis  Dehiscence of external surgical wound  left achilles    Premium Reason  A. 3PM - 7AM    Comments:

## 2020-02-17 NOTE — PROGRESS NOTES
Med rec updated and complete  Allergies reviewed  Interviewed pt with daughter at bedside with permission from pt.  Pt reports that her doctor just switched her to ADDERALL 30MG 1 tablet QDAY on 2/14/2020, pt was taking 20MG QDAY.  Pt reports no antibiotics in the last 2 weeks

## 2020-02-17 NOTE — ANESTHESIA PREPROCEDURE EVALUATION
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   (+) Hormone replacement therapy (HRT)   (+) Osteoarthritis   (+) Scoliosis (and kyphoscoliosis), idiopathic       Physical Exam    Airway   Mallampati: II  TM distance: >3 FB  Neck ROM: full       Cardiovascular - normal exam  Rhythm: regular  Rate: normal  (-) murmur     Dental - normal exam         Pulmonary - normal exam  Breath sounds clear to auscultation     Abdominal    Neurological - normal exam                 Anesthesia Plan    ASA 2       Plan - general       Airway plan will be LMA        Induction: intravenous    Postoperative Plan: Postoperative administration of opioids is intended.    Pertinent diagnostic labs and testing reviewed    Informed Consent:    Anesthetic plan and risks discussed with patient.    Use of blood products discussed with: patient whom consented to blood products.

## 2020-02-18 LAB
GRAM STN SPEC: NORMAL
SIGNIFICANT IND 70042: NORMAL
SITE SITE: NORMAL
SOURCE SOURCE: NORMAL

## 2020-02-18 NOTE — OR NURSING
"Assumed care of patient at approx 1630.  Patient alert and oriented x 4. See flowsheets for VS.  Pain is rated 5/10 \"tolerable.\"     Call light and personal belongings within reach. [place] in lowest position. Monitor alarms set appropriately.    Dressing is CDI . See flowsheets for detailed wound documentation.           "

## 2020-02-18 NOTE — OR NURSING
Pt void x 1.    Educated pt and family on when and how to pull the NATALYA drain, they verbalized understanding.    Discharge information reviewed with patient and responsible adult. No questions or concerns at this time.     IV discontinued.     See vital sign flowsheets  for discharge details.    Crutches at home.

## 2020-02-18 NOTE — DISCHARGE INSTRUCTIONS
ACTIVITY: Rest and take it easy for the first 24 hours.  A responsible adult is recommended to remain with you during that time.  It is normal to feel sleepy.  We encourage you to not do anything that requires balance, judgment or coordination.    MILD FLU-LIKE SYMPTOMS ARE NORMAL. YOU MAY EXPERIENCE GENERALIZED MUSCLE ACHES, THROAT IRRITATION, HEADACHE AND/OR SOME NAUSEA.    FOR 24 HOURS DO NOT:  Drive, operate machinery or run household appliances.  Drink beer or alcoholic beverages.   Make important decisions or sign legal documents.    SPECIAL INSTRUCTIONS:   Non Weight Bearing Left Lower Extremity  Crutches  Ice elevate  NATALYA drain- when the light stops or turns red and there may or may not have small beep present in approximately 7 days, then remove drain tubing from skin.       DIET: To avoid nausea, slowly advance diet as tolerated, avoiding spicy or greasy foods for the first day.  Add more substantial food to your diet according to your physician's instructions.  Babies can be fed formula or breast milk as soon as they are hungry.  INCREASE FLUIDS AND FIBER TO AVOID CONSTIPATION.    SURGICAL DRESSING/BATHING: Keep dressing clean and dry.    FOLLOW-UP APPOINTMENT:  A follow-up appointment should be arranged with your doctor in 1-2 weeks; call to schedule.    You should CALL YOUR PHYSICIAN if you develop:  Fever greater than 101 degrees F.  Pain not relieved by medication, or persistent nausea or vomiting.  Excessive bleeding (blood soaking through dressing) or unexpected drainage from the wound.  Extreme redness or swelling around the incision site, drainage of pus or foul smelling drainage.  Inability to urinate or empty your bladder within 8 hours.  Problems with breathing or chest pain.    You should call 911 if you develop problems with breathing or chest pain.  If you are unable to contact your doctor or surgical center, you should go to the nearest emergency room or urgent care center.  Physician's  telephone #: [phone] Dr Freeman    If any questions arise, call your doctor.  If your doctor is not available, please feel free to call the Surgical Center at [phone].  The Center is open Monday through Friday from 7AM to 7PM.  You can also call the HEALTH HOTLINE open 24 hours/day, 7 days/week and speak to a nurse at [phone], or toll free at [phone].    A registered nurse may call you a few days after your surgery to see how you are doing after your procedure.    MEDICATIONS: Resume taking daily medication.  Take prescribed pain medication with food.  If no medication is prescribed, you may take non-aspirin pain medication if needed.  PAIN MEDICATION CAN BE VERY CONSTIPATING.  Take a stool softener or laxative such as senokot, pericolace, or milk of magnesia if needed.    Pain medication at home.  Last pain medication given at 3:40pm.    If your physician has prescribed pain medication that includes Acetaminophen (Tylenol), do not take additional Acetaminophen (Tylenol) while taking the prescribed medication.    Depression / Suicide Risk    As you are discharged from this [facility] facility, it is important to learn how to keep safe from harming yourself.    Recognize the warning signs:  · Abrupt changes in personality, positive or negative- including increase in energy   · Giving away possessions  · Change in eating patterns- significant weight changes-  positive or negative  · Change in sleeping patterns- unable to sleep or sleeping all the time   · Unwillingness or inability to communicate  · Depression  · Unusual sadness, discouragement and loneliness  · Talk of wanting to die  · Neglect of personal appearance   · Rebelliousness- reckless behavior  · Withdrawal from people/activities they love  · Confusion- inability to concentrate     If you or a loved one observes any of these behaviors or has concerns about self-harm, here's what you can do:  · Talk about it- your feelings and  reasons for harming yourself  · Remove any means that you might use to hurt yourself (examples: pills, rope, extension cords, firearm)  · Get professional help from the community (Mental Health, Substance Abuse, psychological counseling)  · Do not be alone:Call your Safe Contact- someone whom you trust who will be there for you.  · Call your local CRISIS HOTLINE 456-1768 or 567-615-7124  · Call your local Children's Mobile Crisis Response Team [place] (196) 707-5269 or www.[employer]  · Call the toll free National Suicide Prevention Hotlines   · National Suicide Prevention Lifeline 147-743-LRBU (1664)  · National Hope Line Network 800-SUICIDE (263-5236)

## 2020-02-18 NOTE — ANESTHESIA POSTPROCEDURE EVALUATION
Patient: Jojo Walker    Procedure Summary     Date:  02/17/20 Room / Location:  [place] [id number] / SURGERY [facility]    Anesthesia Start:  1443 Anesthesia Stop:  1529    Procedures:       INCISION AND DRAINAGE, WOUND, BY ORTHOPEDICS- ACHILLES DEBRIDE/WOUND REVISION AND INCISIONAL VAC (Left Ankle)      REPAIR, TENDON- FLEXOR HALLUCIS LONGUS RELEASE 5th toe (Left Toe) Diagnosis:  (left achilles surgical wound dehisence, left 5th toe contracture)    Surgeon:  Home Freeman M.D. Responsible Provider:  Fabian Townsend M.D.    Anesthesia Type:  general ASA Status:  2          Final Anesthesia Type: general  Last vitals  BP   Blood Pressure: 136/58    Temp   36.3 °C (97.3 °F)    Pulse   Pulse: 68   Resp   16    SpO2   99 %      Anesthesia Post Evaluation    Patient location during evaluation: PACU  Patient participation: complete - patient participated  Level of consciousness: awake and alert  Pain score: 5    Airway patency: patent  Anesthetic complications: no  Cardiovascular status: hemodynamically stable  Respiratory status: acceptable  Hydration status: euvolemic    PONV: none           Nurse Pain Score: 5 (NPRS)

## 2020-02-18 NOTE — OP REPORT
DATE OF SERVICE:  02/17/2020    PREOPERATIVE DIAGNOSES:  1.  Left posterior Achilles wound dehiscence.  2.  Left calcification of the Achilles.  3.  Left claw toe, tendons 1 and 5.    POSTOPERATIVE DIAGNOSES:  1.  Left posterior Achilles wound dehiscence.  2.  Left calcification of the Achilles.  3.  Left claw toe, tendons 1 and 5.    PROCEDURES PERFORMED:  1.  Left wound dehiscence repair, complex, Achilles.  2.  Left deep Achilles debridement and excision.  3.  Left placement of negative pressure incisional dressing.  4.  Left percutaneous flexor tendon release, toes 1 and 5.    SURGEON:  Home Freeman MD    FIRST ASSISTANT:  Jarred Levy MD    SECOND ASSISTANT:  Anisa Soto.    ANESTHESIA:  General endotracheal with local.    ESTIMATED BLOOD LOSS:  None.    COMPLICATIONS:  None.    POSTOPERATIVE PLAN:  1.  Nonweightbearing.  2.  Perioperative antibiotics.  3.  Follow up in 2 weeks.    INDICATIONS:  The patient is a pleasant 54-year-old female who has had   problems with her left heel.  She had previous surgery.  She also is having   separate problems with her toes, which she wanted surgically addressed.  The   above procedure was discussed and all questions were answered.  Risks of   surgery were explained, which include, but not limited to wound problems,   infection, nerve injury, vascular injury, need for further surgery.  She   understands she could have persistent risk for pain, wound and need for   further surgery.  She understands and accepts these risks and agrees to   proceed.  Site was marked by myself prior to receiving psychotropic medicines.    PROCEDURE IN DETAIL:  The patient was brought to the operating room.  She   underwent general endotracheal anesthesia without complications.  Her left   lower extremity was prepped and draped in standard fashion in the lateral   position with all appropriate padding.  Positive site verification confirmed   left lower extremity as well as above  procedure and confirmation that she   received preoperative antibiotics.  Esmarch was used to exsanguinate her foot   and ankle and leg tourniquet was inflated to 250 mmHg.  Using a #15 blade, a   stab incision was made over her first and fifth toes, releasing the flexor   tendons.  This allowed the toes to relax.  This was closed with interrupted   nylon suture.    An elliptical incision was made over previous wound dehiscence on her Achilles   with #15 blade.  This went down to the level of the Achilles.  Once this was   completed, she had some Achilles deep in her wound.  A bone cutter was used to   cut through the calcific portion of the Achilles distally.  Achilles tendon   was removed at that point.  Thorough irrigation was performed.  The wound was   then closed in a layered fashion using 3-0 Vicryl and 3-0 nylon.  A negative   pressure incisional dressing was placed with positive seal.  Sterile dressings   were applied.  She was placed into a posterior sugar-tong splint.  She was   transferred to recovery room in good condition.    Next, utilization of Dr. Levy was necessary for patient positioning, holding,   retracting, closure, dressing and splint placement.  He was present   throughout the entire procedure.       ____________________________________     MD ADAM SELF / NTS    DD:  02/17/2020 15:59:10  DT:  02/17/2020 16:32:20    D#:  [id number]  Job#:  [id number]    cc: [facility]

## 2020-02-20 LAB
BACTERIA TISS AEROBE CULT: NORMAL
GRAM STN SPEC: NORMAL
SIGNIFICANT IND 70042: NORMAL
SITE SITE: NORMAL
SOURCE SOURCE: NORMAL

## 2020-02-22 LAB
BACTERIA SPEC ANAEROBE CULT: NORMAL
SIGNIFICANT IND 70042: NORMAL
SITE SITE: NORMAL
SOURCE SOURCE: NORMAL

## 2020-08-28 PROBLEM — M51.36 DDD (DEGENERATIVE DISC DISEASE), LUMBAR: Status: ACTIVE | Noted: 2020-08-28

## 2020-08-28 PROBLEM — G89.29 ENCOUNTER FOR CHRONIC PAIN MANAGEMENT: Status: ACTIVE | Noted: 2020-08-28

## 2020-08-28 PROBLEM — R63.1 POLYDIPSIA: Status: ACTIVE | Noted: 2020-08-28

## 2020-08-28 PROBLEM — F90.8 ADHD, ADULT RESIDUAL TYPE: Status: ACTIVE | Noted: 2020-08-28

## 2020-08-28 PROBLEM — M51.369 DDD (DEGENERATIVE DISC DISEASE), LUMBAR: Status: ACTIVE | Noted: 2020-08-28

## 2020-08-28 PROBLEM — R35.0 URINARY FREQUENCY: Status: ACTIVE | Noted: 2020-08-28

## 2020-10-05 PROBLEM — R39.15 URINARY URGENCY: Status: ACTIVE | Noted: 2020-10-05

## 2020-10-05 PROBLEM — N39.41 URGENCY INCONTINENCE: Status: ACTIVE | Noted: 2020-10-05

## 2020-10-29 ENCOUNTER — HOSPITAL ENCOUNTER (OUTPATIENT)
Dept: RADIOLOGY | Facility: MEDICAL CENTER | Age: 55
End: 2020-10-29
Attending: UROLOGY
Payer: MEDICARE

## 2020-10-29 ENCOUNTER — HOSPITAL ENCOUNTER (OUTPATIENT)
Dept: LAB | Facility: MEDICAL CENTER | Age: 55
End: 2020-10-29
Attending: UROLOGY
Payer: MEDICARE

## 2020-10-29 DIAGNOSIS — R35.0 URINARY FREQUENCY: ICD-10-CM

## 2020-10-29 LAB
APPEARANCE UR: CLEAR
BILIRUB UR QL STRIP.AUTO: NEGATIVE
COLOR UR: YELLOW
GLUCOSE UR STRIP.AUTO-MCNC: NEGATIVE MG/DL
KETONES UR STRIP.AUTO-MCNC: NEGATIVE MG/DL
LEUKOCYTE ESTERASE UR QL STRIP.AUTO: NEGATIVE
MICRO URNS: NORMAL
NITRITE UR QL STRIP.AUTO: NEGATIVE
PH UR STRIP.AUTO: 6.5 [PH] (ref 5–8)
PROT UR QL STRIP: NEGATIVE MG/DL
RBC UR QL AUTO: NEGATIVE
SP GR UR STRIP.AUTO: 1.01
UROBILINOGEN UR STRIP.AUTO-MCNC: 0.2 MG/DL

## 2020-10-29 PROCEDURE — 76775 US EXAM ABDO BACK WALL LIM: CPT

## 2020-10-29 PROCEDURE — 81003 URINALYSIS AUTO W/O SCOPE: CPT

## 2021-02-03 ENCOUNTER — HOSPITAL ENCOUNTER (OUTPATIENT)
Dept: RADIOLOGY | Facility: MEDICAL CENTER | Age: 56
End: 2021-02-03
Attending: NURSE PRACTITIONER
Payer: MEDICARE

## 2021-02-03 ENCOUNTER — HOSPITAL ENCOUNTER (OUTPATIENT)
Dept: RADIOLOGY | Facility: MEDICAL CENTER | Age: 56
End: 2021-02-03
Attending: FAMILY MEDICINE
Payer: MEDICARE

## 2021-02-03 DIAGNOSIS — M25.559 PAIN IN JOINT INVOLVING PELVIC REGION AND THIGH, UNSPECIFIED LATERALITY: ICD-10-CM

## 2021-02-03 DIAGNOSIS — M47.816 LUMBAR SPONDYLOSIS: ICD-10-CM

## 2021-02-03 DIAGNOSIS — M25.512 LEFT SHOULDER PAIN, UNSPECIFIED CHRONICITY: ICD-10-CM

## 2021-02-03 DIAGNOSIS — S49.90XA INJURY OF CLAVICLE, INITIAL ENCOUNTER: ICD-10-CM

## 2021-02-03 PROCEDURE — 73030 X-RAY EXAM OF SHOULDER: CPT | Mod: LT

## 2021-02-03 PROCEDURE — 72100 X-RAY EXAM L-S SPINE 2/3 VWS: CPT

## 2021-02-03 PROCEDURE — 72070 X-RAY EXAM THORAC SPINE 2VWS: CPT

## 2021-02-03 PROCEDURE — 73521 X-RAY EXAM HIPS BI 2 VIEWS: CPT

## 2021-02-03 PROCEDURE — 73000 X-RAY EXAM OF COLLAR BONE: CPT | Mod: LT

## 2021-07-19 ENCOUNTER — HOSPITAL ENCOUNTER (OUTPATIENT)
Dept: RADIOLOGY | Facility: MEDICAL CENTER | Age: 56
End: 2021-07-19
Attending: SPECIALIST
Payer: MEDICARE

## 2021-07-19 DIAGNOSIS — M54.16 LUMBAR RADICULOPATHY: ICD-10-CM

## 2021-07-19 PROCEDURE — 72148 MRI LUMBAR SPINE W/O DYE: CPT | Mod: MH

## 2022-01-25 ENCOUNTER — TELEPHONE (OUTPATIENT)
Dept: SCHEDULING | Facility: IMAGING CENTER | Age: 57
End: 2022-01-25

## 2022-05-03 ENCOUNTER — HOSPITAL ENCOUNTER (OUTPATIENT)
Dept: RADIOLOGY | Facility: MEDICAL CENTER | Age: 57
End: 2022-05-03
Attending: SPECIALIST
Payer: MEDICARE

## 2022-05-03 DIAGNOSIS — M54.16 LUMBAR RADICULOPATHY: ICD-10-CM

## 2022-05-03 DIAGNOSIS — M16.9 HIP ARTHROSIS: ICD-10-CM

## 2022-05-03 PROCEDURE — 73502 X-RAY EXAM HIP UNI 2-3 VIEWS: CPT | Mod: RT

## 2022-05-03 PROCEDURE — 72110 X-RAY EXAM L-2 SPINE 4/>VWS: CPT

## 2022-07-07 ENCOUNTER — HOSPITAL ENCOUNTER (OUTPATIENT)
Dept: RADIOLOGY | Facility: MEDICAL CENTER | Age: 57
End: 2022-07-07
Attending: FAMILY MEDICINE
Payer: MEDICARE

## 2022-07-07 ENCOUNTER — HOSPITAL ENCOUNTER (OUTPATIENT)
Dept: LAB | Facility: MEDICAL CENTER | Age: 57
End: 2022-07-07
Attending: FAMILY MEDICINE
Payer: MEDICARE

## 2022-07-07 DIAGNOSIS — R92.8 OTHER ABNORMAL AND INCONCLUSIVE FINDINGS ON DIAGNOSTIC IMAGING OF BREAST: ICD-10-CM

## 2022-07-07 DIAGNOSIS — N63.10 BILATERAL BREAST LUMP: ICD-10-CM

## 2022-07-07 DIAGNOSIS — N63.20 BILATERAL BREAST LUMP: ICD-10-CM

## 2022-07-07 DIAGNOSIS — Z78.0 POSTMENOPAUSAL: ICD-10-CM

## 2022-07-07 DIAGNOSIS — R94.6 ABNORMAL THYROID FUNCTION TEST: ICD-10-CM

## 2022-07-07 LAB
T3FREE SERPL-MCNC: 4.37 PG/ML (ref 2–4.4)
T4 FREE SERPL-MCNC: 1.2 NG/DL (ref 0.93–1.7)
TSH SERPL DL<=0.005 MIU/L-ACNC: 2.72 UIU/ML (ref 0.38–5.33)

## 2022-07-07 PROCEDURE — 84481 FREE ASSAY (FT-3): CPT

## 2022-07-07 PROCEDURE — 36415 COLL VENOUS BLD VENIPUNCTURE: CPT

## 2022-07-07 PROCEDURE — 86376 MICROSOMAL ANTIBODY EACH: CPT

## 2022-07-07 PROCEDURE — 76642 ULTRASOUND BREAST LIMITED: CPT | Mod: RT

## 2022-07-07 PROCEDURE — 77080 DXA BONE DENSITY AXIAL: CPT

## 2022-07-07 PROCEDURE — 84439 ASSAY OF FREE THYROXINE: CPT

## 2022-07-07 PROCEDURE — 84443 ASSAY THYROID STIM HORMONE: CPT

## 2022-07-07 PROCEDURE — G0279 TOMOSYNTHESIS, MAMMO: HCPCS

## 2022-07-07 PROCEDURE — 86800 THYROGLOBULIN ANTIBODY: CPT

## 2022-07-09 LAB
THYROGLOB AB SERPL-ACNC: <0.9 IU/ML (ref 0–4)
THYROPEROXIDASE AB SERPL-ACNC: 0.7 IU/ML (ref 0–9)

## 2022-07-14 ENCOUNTER — HOSPITAL ENCOUNTER (OUTPATIENT)
Dept: RADIOLOGY | Facility: MEDICAL CENTER | Age: 57
End: 2022-07-14
Payer: MEDICARE

## 2022-07-14 NOTE — RESULT ENCOUNTER NOTE
I am pleased to inform you that your Mammogram and ultrasound was reviewed and benign. Repeat in 1-yr recommended

## 2022-09-08 RX ORDER — GABAPENTIN 100 MG/1
100 CAPSULE ORAL
COMMUNITY
Start: 2022-08-16 | End: 2022-10-03

## 2022-09-08 RX ORDER — DICLOFENAC SODIUM 75 MG/1
75 TABLET, DELAYED RELEASE ORAL
COMMUNITY
Start: 2022-08-16 | End: 2022-10-03

## 2022-09-08 RX ORDER — ESCITALOPRAM OXALATE 10 MG/1
10 TABLET ORAL EVERY MORNING
COMMUNITY
End: 2022-10-03

## 2022-10-03 ENCOUNTER — OFFICE VISIT (OUTPATIENT)
Dept: MEDICAL GROUP | Facility: PHYSICIAN GROUP | Age: 57
End: 2022-10-03
Payer: MEDICARE

## 2022-10-03 VITALS
WEIGHT: 156 LBS | HEART RATE: 91 BPM | TEMPERATURE: 98.9 F | HEIGHT: 64 IN | SYSTOLIC BLOOD PRESSURE: 122 MMHG | BODY MASS INDEX: 26.63 KG/M2 | DIASTOLIC BLOOD PRESSURE: 60 MMHG | OXYGEN SATURATION: 100 %

## 2022-10-03 DIAGNOSIS — R20.0 NUMBNESS: ICD-10-CM

## 2022-10-03 DIAGNOSIS — R29.90 NEUROLOGICAL SYMPTOMS: ICD-10-CM

## 2022-10-03 DIAGNOSIS — R47.89 SPELL OF CHANGE IN SPEECH: ICD-10-CM

## 2022-10-03 DIAGNOSIS — F41.9 ANXIETY: Chronic | ICD-10-CM

## 2022-10-03 DIAGNOSIS — F90.8 ADHD, ADULT RESIDUAL TYPE: ICD-10-CM

## 2022-10-03 DIAGNOSIS — Z23 NEED FOR VACCINATION: ICD-10-CM

## 2022-10-03 PROBLEM — R35.0 URINARY FREQUENCY: Status: RESOLVED | Noted: 2020-08-28 | Resolved: 2022-10-03

## 2022-10-03 PROBLEM — N39.41 URGENCY INCONTINENCE: Status: RESOLVED | Noted: 2020-10-05 | Resolved: 2022-10-03

## 2022-10-03 PROBLEM — G89.18 POSTOPERATIVE PAIN: Status: RESOLVED | Noted: 2019-12-29 | Resolved: 2022-10-03

## 2022-10-03 PROCEDURE — 99205 OFFICE O/P NEW HI 60 MIN: CPT | Mod: 25 | Performed by: FAMILY MEDICINE

## 2022-10-03 PROCEDURE — 90686 IIV4 VACC NO PRSV 0.5 ML IM: CPT | Performed by: FAMILY MEDICINE

## 2022-10-03 PROCEDURE — G0008 ADMIN INFLUENZA VIRUS VAC: HCPCS | Performed by: FAMILY MEDICINE

## 2022-10-03 RX ORDER — SERTRALINE HYDROCHLORIDE 25 MG/1
25 TABLET, FILM COATED ORAL DAILY
Qty: 30 TABLET | Refills: 0 | Status: SHIPPED | OUTPATIENT
Start: 2022-10-03 | End: 2022-11-07 | Stop reason: CLARIF

## 2022-10-03 ASSESSMENT — ENCOUNTER SYMPTOMS
FEVER: 0
SHORTNESS OF BREATH: 0
CHILLS: 0
CONSTIPATION: 1
BLURRED VISION: 1
SORE THROAT: 0

## 2022-10-03 NOTE — PROGRESS NOTES
"Subjective:     CC:  Diagnoses of Neurological symptoms, Spell of change in speech, Numbness, ADHD, adult residual type- saray , [place] specialist, Anxiety, and Need for vaccination were pertinent to this visit.    HISTORY OF THE PRESENT ILLNESS: Patient is a 57 y.o. female. This pleasant patient is here today to establish care and discuss neurologic concern. Her prior PCP was Isadora Mancia DO in [place], [place].    Neurologic symptom  9/29: While standing, talking to her daughter, lost complete control of her bladder. No h/o incontinence.  9/30: While laying on couch, all of a sudden felt poorly. She became flushed and had hives. She sat up and started shaking a lot. Started in legs, then moved to hands and head. Lasted 15 minutes. She could think clearly during the event and could answer questions. No chest pain. Since then, her speech seems strange since then. She didn't get evaluated. That night, felt like she was \"beat up\". Severe fatigue and severe nausea. Left leg and hand was completely numb (but chronic left toe numbness)    Still have residual speech changes. No residual numbness. Some repeat tremor in the right hand. No repeat loss of bladder.     Tremor  Mild shaking of the right side in the last 2 weeks. She only noticed the tremor when using the cane until the above episode.     Headaches  2 weeks  Forehead and base of skull  Pounding  No nausea/vomiting, +photophobia,  +phonophobia  She just started lexapro 3 weeks ago, try to come off adderall    Problem   ADHD, adult residual type- saray , [place] specialist    Chronic. Had been following with psychiatry, but stopped as she wasn't comfortable with the psychiatrist. Her last PCP took her off adderall and started her on lexapro.     Anxiety    Chronic. Reports long-term anxiety. She was on lexapro in the past that worked well. Started back on lexapro 3 weeks ago - started getting tremor (after marketing report) and headaches (24%).      Urgency Incontinence " "(Resolved)   Urinary Frequency (Resolved)   Postoperative Pain (Resolved)     Health Maintenance: Completed    ROS:   Review of Systems   Constitutional:  Negative for chills and fever.   HENT:  Negative for sore throat.    Eyes:  Positive for blurred vision.        Chronic, 1 year   Respiratory:  Negative for shortness of breath.    Cardiovascular:  Negative for chest pain.   Gastrointestinal:  Positive for constipation.   Genitourinary:  Positive for urgency. Negative for dysuria.        Chronic, 2 years   Skin:  Negative for rash.       Objective:     Exam: /60 (BP Location: Right arm, Patient Position: Sitting, BP Cuff Size: Adult)   Pulse 91   Temp 37.2 °C (98.9 °F) (Temporal)   Ht 1.626 m (5' 4\")   Wt 70.8 kg (156 lb)   SpO2 100%  Body mass index is 26.78 kg/m².    Physical Exam  Constitutional:       Appearance: Normal appearance.   Neck:      Thyroid: No thyroid mass or thyromegaly.   Cardiovascular:      Rate and Rhythm: Normal rate and regular rhythm.      Heart sounds: Normal heart sounds.   Pulmonary:      Effort: Pulmonary effort is normal.      Breath sounds: Normal breath sounds.   Musculoskeletal:      Cervical back: Normal range of motion and neck supple.   Neurological:      Mental Status: She is alert.      Comments: Neuro: CN II-XII intact, strength 5/5 in all muscle groups, sensation intact bilaterally to light touch except over left leg       Assessment & Plan:   57 y.o. female with the following -    1. Neurological symptoms  2. Spell of change in speech  3. Numbness  Acute, diagnosis uncertain, prognosis uncertain, potentially threatening to life or bodily function.  She reports on 9/29 while standing and talking with her daughter she suddenly lost complete control of her bladder with no forewarning.  The next day while sitting on the couch she started to feel very unwell with flushing and hives on her chest and neck.  Shortly following that she developed full body shaking that " lasted for 15 minutes.  She reports she was conscious during the entire episode and did attempt to answer her daughter's questions.  However during that time she did have a change in her speech was very hard to get the words out.  Her speech has not normalized since that episode.  Later that night she had a lot of nausea and extreme fatigue and noted some numbness of her left leg.  She has chronic numbness of the left leg but this was worse than normal.  Neurological exam is basically normal except for numbness of the left leg which she states is chronic.  I did ponder sending her straight to the ER as she did not see any provider after this event but since has been several days I am not certain the ER is helpful at this time.  Therefore, I am getting a stat CT scan as I am highly concerned that she either had a seizure or a stroke or other neurologic event in her brain to explain her symptoms.  In the meantime, I told her if this event repeats itself she has to go to the emergency room for urgent evaluation.  - CT-HEAD W/O; Future    4. ADHD, adult residual type- JOSIAH so specialist  Chronic, stable.  She reports that she has a longstanding history of ADHD and had been treated with Ritalin and Adderall in the past.  However, she has not feel these of the right medications for her so right now she is focusing on treating her anxiety because when her anxiety is controlled her ADHD is better controlled.    5. Anxiety  Chronic, side effects of treatment.  She reports that 3 weeks ago her prior PCP did start her on Lexapro.  Then about a week later she started noticing a tremor in her right hand and pretty significant headaches.  24% of people did report headaches on Lexapro and tremor is a an after market report for Lexapro.  Additionally, regarding the above episode, Lexapro does have seizures reported as an after market side effect.  Therefore, we are going to stop the Lexapro and start her on sertraline.  It does  not have headache listed as a side effect but it does have tremor listed.  -Stop Lexapro  - Start sertraline 25 mg daily    6. Need for vaccination  - INFLUENZA VACCINE QUAD INJ (PF)    Return in about 2 weeks (around 10/17/2022) for f/u headache, tremor, anxiety.    Please note that this dictation was created using voice recognition software. I have made every reasonable attempt to correct obvious errors, but I expect that there are errors of grammar and possibly content that I did not discover before finalizing the note.

## 2022-10-05 ENCOUNTER — HOSPITAL ENCOUNTER (OUTPATIENT)
Dept: RADIOLOGY | Facility: MEDICAL CENTER | Age: 57
End: 2022-10-05
Attending: FAMILY MEDICINE
Payer: MEDICARE

## 2022-10-05 DIAGNOSIS — R20.0 NUMBNESS: ICD-10-CM

## 2022-10-05 DIAGNOSIS — R29.90 NEUROLOGICAL SYMPTOMS: ICD-10-CM

## 2022-10-05 DIAGNOSIS — R47.89 SPELL OF CHANGE IN SPEECH: ICD-10-CM

## 2022-10-05 PROCEDURE — 70450 CT HEAD/BRAIN W/O DYE: CPT

## 2022-12-21 PROBLEM — Z78.0 POSTMENOPAUSAL: Status: ACTIVE | Noted: 2022-12-21

## 2022-12-21 PROBLEM — M51.36 DDD (DEGENERATIVE DISC DISEASE), LUMBAR: Chronic | Status: ACTIVE | Noted: 2020-08-28

## 2022-12-21 PROBLEM — Z78.0 POSTMENOPAUSAL: Chronic | Status: ACTIVE | Noted: 2022-12-21

## 2022-12-21 PROBLEM — R63.1 POLYDIPSIA: Status: RESOLVED | Noted: 2020-08-28 | Resolved: 2022-12-21

## 2022-12-21 PROBLEM — M51.369 DDD (DEGENERATIVE DISC DISEASE), LUMBAR: Chronic | Status: ACTIVE | Noted: 2020-08-28

## 2022-12-21 PROBLEM — F90.8 ADHD, ADULT RESIDUAL TYPE: Chronic | Status: ACTIVE | Noted: 2020-08-28

## 2022-12-21 PROBLEM — R39.15 URINARY URGENCY: Chronic | Status: ACTIVE | Noted: 2020-10-05

## 2022-12-21 PROBLEM — M35.00 SJOGREN'S SYNDROME (HCC): Status: ACTIVE | Noted: 2022-12-21

## 2022-12-21 PROBLEM — M45.A0 NON-RADIOGRAPHIC AXIAL SPONDYLOARTHRITIS (HCC): Chronic | Status: ACTIVE | Noted: 2022-12-21

## 2022-12-21 PROBLEM — M35.00 SJOGREN'S SYNDROME (HCC): Status: RESOLVED | Noted: 2022-12-21 | Resolved: 2022-12-21

## 2022-12-21 PROBLEM — M45.A0 NON-RADIOGRAPHIC AXIAL SPONDYLOARTHRITIS (HCC): Status: ACTIVE | Noted: 2022-12-21

## 2023-01-09 PROBLEM — M19.041 OSTEOARTHRITIS OF RIGHT HAND: Status: ACTIVE | Noted: 2023-01-09

## 2023-01-09 PROBLEM — M65.311 TRIGGER THUMB OF RIGHT HAND: Status: ACTIVE | Noted: 2023-01-09

## 2023-01-09 PROBLEM — M67.431 GANGLION CYST OF VOLAR ASPECT OF RIGHT WRIST: Status: ACTIVE | Noted: 2023-01-09

## 2023-01-09 PROBLEM — G89.29 CHRONIC PAIN OF RIGHT HAND: Status: ACTIVE | Noted: 2023-01-09

## 2023-01-09 PROBLEM — M79.641 CHRONIC PAIN OF RIGHT HAND: Status: ACTIVE | Noted: 2023-01-09

## 2023-03-28 PROBLEM — H90.3 SENSORINEURAL HEARING LOSS (SNHL) OF BOTH EARS: Status: ACTIVE | Noted: 2023-03-28

## 2023-08-30 PROBLEM — R22.31 MASS OF RIGHT HAND: Status: ACTIVE | Noted: 2023-08-30

## 2023-10-05 ENCOUNTER — OFFICE VISIT (OUTPATIENT)
Dept: URGENT CARE | Facility: PHYSICIAN GROUP | Age: 58
End: 2023-10-05
Payer: MEDICARE

## 2023-10-05 VITALS
WEIGHT: 140 LBS | BODY MASS INDEX: 24.8 KG/M2 | RESPIRATION RATE: 16 BRPM | SYSTOLIC BLOOD PRESSURE: 138 MMHG | HEIGHT: 63 IN | TEMPERATURE: 97.6 F | HEART RATE: 95 BPM | DIASTOLIC BLOOD PRESSURE: 90 MMHG | OXYGEN SATURATION: 99 %

## 2023-10-05 DIAGNOSIS — L03.011 INFECTION OF NAIL BED OF FINGER OF RIGHT HAND: ICD-10-CM

## 2023-10-05 PROCEDURE — 3080F DIAST BP >= 90 MM HG: CPT | Performed by: PHYSICIAN ASSISTANT

## 2023-10-05 PROCEDURE — 3075F SYST BP GE 130 - 139MM HG: CPT | Performed by: PHYSICIAN ASSISTANT

## 2023-10-05 PROCEDURE — 90714 TD VACC NO PRESV 7 YRS+ IM: CPT | Performed by: PHYSICIAN ASSISTANT

## 2023-10-05 PROCEDURE — 90471 IMMUNIZATION ADMIN: CPT | Performed by: PHYSICIAN ASSISTANT

## 2023-10-05 PROCEDURE — 99214 OFFICE O/P EST MOD 30 MIN: CPT | Mod: 25 | Performed by: PHYSICIAN ASSISTANT

## 2023-10-05 RX ORDER — AMOXICILLIN AND CLAVULANATE POTASSIUM 875; 125 MG/1; MG/1
1 TABLET, FILM COATED ORAL 2 TIMES DAILY
Qty: 10 TABLET | Refills: 0 | Status: SHIPPED | OUTPATIENT
Start: 2023-10-05 | End: 2023-10-10

## 2023-10-05 ASSESSMENT — ENCOUNTER SYMPTOMS
CHILLS: 0
FEVER: 0

## 2023-10-06 NOTE — PROGRESS NOTES
"  Subjective:   Jojo Walker is a 58 y.o. female who presents today with   Chief Complaint   Patient presents with    Nail Problem     Right ring finger ,had acrylics put on ,had pain and swelling same night. Also cut finger nail . X 4 days     Nail Problem  This is a new problem. Episode onset: 4 days. The problem occurs constantly. The problem has been unchanged. Pertinent negatives include no chills or fever. Treatments tried: warm soaks. The treatment provided mild relief.     Patient states she had acrylic nails put on 4 days ago and she states when she was there she noticed discomfort as they were using the tool as if she had been cut but the  said \"it is okay you are just sensitive\", and put the nail over the top.  Patient was unable to see that at that time she had been cut across the top of the nail.  Last night she started having some discomfort and swelling in the nail and today she was able to soak the acrylic nail off and purulent pus came from the nail bed.  Swelling went down after this and her symptoms did improve some but she is still having some redness to the end of her finger.    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  MEDS:   Current Outpatient Medications:     amoxicillin-clavulanate (AUGMENTIN) 875-125 MG Tab, Take 1 Tablet by mouth 2 times a day for 5 days., Disp: 10 Tablet, " Rfl: 0    escitalopram (LEXAPRO) 10 MG Tab, Take 1 Tablet by mouth every morning., Disp: 90 Tablet, Rfl: 0    cevimeline (EVOXAC) 30 MG capsule, Take 30 mg by mouth., Disp: , Rfl:     estradiol (ESTRACE) 0.5 MG tablet, 5 mg., Disp: , Rfl:     OZEMPIC, 1 MG/DOSE, 4 MG/3ML Solution Pen-injector, , Disp: , Rfl:     COSENTYX SENSOREADY, 300 MG, 150 MG/ML Solution Auto-injector, , Disp: , Rfl:     ADDERALL, 30MG, 30 MG tablet, , Disp: , Rfl:     zolpidem (AMBIEN) 5 MG Tab, Take 5 mg by mouth at bedtime as needed., Disp: , Rfl:     diclofenac DR (VOLTAREN) 75 MG Tablet Delayed Response, Take 75 mg by mouth., Disp: , Rfl:     Cholecalciferol (VITAMIN D3 PO), Take 1,000 Units by mouth every evening., Disp: , Rfl:     oxyCODONE-acetaminophen (PERCOCET) 7.5-325 MG per tablet, Take 1 Tab by mouth 4 times a day., Disp: , Rfl:     fluticasone (FLONASE) 50 MCG/ACT nasal spray, Spray 1 Spray in nose every day., Disp: , Rfl:     Carboxymethylcellulose Sodium (LUBRICANT EYE DROPS OP), Place 2 Drops in both eyes as needed (Dry eye)., Disp: , Rfl:     estradiol (ESTRACE) 0.5 MG tablet, TAKE 1 TABLET EVERY DAY (Patient not taking: Reported on 10/5/2023), Disp: 90 Tablet, Rfl: 1    Adalimumab 40 MG/0.4ML Pen-injector Kit, Inject 40 mg under the skin., Disp: , Rfl:     HUMIRA PEN 40 MG/0.4ML Pen-injector Kit, , Disp: , Rfl:     diclofenac sodium (VOLTAREN) 1 % Gel, , Disp: , Rfl:     diclofenac sodium (VOLTAREN) 1 % Gel, , Disp: , Rfl:     gabapentin (NEURONTIN) 100 MG Cap, Take  by mouth. (Patient not taking: Reported on 10/5/2023), Disp: , Rfl:     triamcinolone acetonide (ORALONE) 0.1 % Paste, APPLY SPARINGLY TO AFFECTED AREA 3 TIMES A DAY. (Patient not taking: Reported on 10/5/2023), Disp: , Rfl:     Semaglutide,0.25 or 0.5MG/DOS, (OZEMPIC, 0.25 OR 0.5 MG/DOSE,) 2 MG/3ML Solution Pen-injector, 50 mg. (Patient not taking: Reported on 10/5/2023), Disp: , Rfl:     amphetamine-dextroamphetamine (ADDERALL, 30MG,) 30 MG tablet, , Disp: ,  "Rfl:     zolpidem (AMBIEN) 5 MG Tab, 5 mg. (Patient not taking: Reported on 10/5/2023), Disp: , Rfl:     zolpidem (AMBIEN) 10 MG Tab, , Disp: , Rfl:     Semaglutide, 1 MG/DOSE, 2 MG/1.5ML Solution Pen-injector, Inject 1 mg under the skin every 7 days. (Patient not taking: Reported on 10/5/2023), Disp: 6 mL, Rfl: 3    escitalopram (LEXAPRO) 20 MG tablet, Take 1 Tablet by mouth every day. (Patient not taking: Reported on 10/5/2023), Disp: 100 Tablet, Rfl: 1    NON SPECIFIED, every day. Compounded Mouth Wash  C-dyphenhyd 0.125% - hydrocort 0.125% - tetracyc 1% - nyst 7500IU/mL - lido 1% (Patient not taking: Reported on 10/5/2023), Disp: , Rfl:     ADDERALL, 20MG, 20 MG Tab, TAKE 1 TABLET BY MOUTH EVERY MORNING FOR 28 DAYS (Patient not taking: Reported on 10/5/2023), Disp: , Rfl:     Secukinumab 150 MG/ML Solution Auto-injector, Inject 150 mg under the skin. (Patient not taking: Reported on 10/5/2023), Disp: , Rfl:   ALLERGIES:   Allergies   Allergen Reactions    Sulfa Drugs      Blood blisters    Codeine Vomiting    Dilaudid [Hydromorphone Hcl] Shortness of Breath     \"It doesn't make me feel good\"    Levaquin Hives    Tape Rash     Regular tape ok everywhere except none on legs  PAPER TAPE OK    Tramadol Hcl Unspecified     \"dizzy and fainting\"    Hydrocodone Vomiting     SURGHX:   Past Surgical History:   Procedure Laterality Date    INCISION AND DRAINAGE ORTHOPEDIC Left 2/17/2020    Procedure: INCISION AND DRAINAGE, WOUND, BY ORTHOPEDICS- ACHILLES DEBRIDE/WOUND REVISION AND INCISIONAL VAC;  Surgeon: Home Freeman M.D.;  Location: SURGERY [facility];  Service: Orthopedics    TENDON REPAIR Left 2/17/2020    Procedure: REPAIR, TENDON- FLEXOR HALLUCIS LONGUS RELEASE 5th toe;  Surgeon: Home Freeman M.D.;  Location: SURGERY [facility];  Service: Orthopedics    ORTHOPEDIC OSTEOTOMY Left 12/27/2019    Procedure: OSTEOTOMY, ORTHOPEDIC-MIDFOOT, HERRING;  Surgeon: Home Freeman M.D.;  Location: SURGERY [facility];  Service: Orthopedics    COLONOSCOPY - ENDO  2017    Procedure: COLONOSCOPY - ENDO;  Surgeon: Melchor Malik M.D.;  Location: SURGERY [facility];  Service:     HARDWARE REMOVAL ORTHO Left 2017    Procedure: HARDWARE REMOVAL LEFT FOOT;  Surgeon: Easton Harrison M.D.;  Location: SURGERY [facility];  Service:     URETEROSCOPY Right 2016    Procedure: Right retrograde pyelogram, stent placement ;  Surgeon: Alan Nunes M.D.;  Location: SURGERY [facility];  Service:     HARDWARE REMOVAL ORTHO Left 1/15/2016    Procedure: HARDWARE REMOVAL LEFT FOOT;  Surgeon: Easton Harrison M.D.;  Location: SURGERY [facility];  Service:     ERIC BY LAPAROSCOPY  2013    Performed by Home Oneal M.D. at SURGERY [facility]    HARDWARE REMOVAL ORTHO  2010    Performed by HOME BHANDARI at SURGERY SAME DAY [facility]    MASS EXCISION ORTHO  08    Performed by HOME BHANDARI at SURGERY SAME DAY [facility]    BREAST RECONSTRUCTION      FOOT SURGERY Bilateral     Multiple    HYSTERECTOMY LAPAROSCOPY  ~    OOPHORECTOMY Bilateral ~    [place], [place]    OTHER ORTHOPEDIC SURGERY      CLUB FEET REPAIR reports multiple surgeries    PRIMARY C SECTION       x 3 //     SOCHX:  reports that she quit smoking about 23 years ago. Her smoking use included cigarettes. She started smoking about 43 years ago. She has a 10.0 pack-year smoking history. She has never used smokeless tobacco. She reports that she does not drink alcohol and does not use drugs.  FH: Reviewed with patient, not pertinent to this visit.       Review of Systems   Constitutional:  Negative for chills and fever.   Skin:         Right hand finger pain      Objective:   BP (!) 138/90   Pulse 95   Temp 36.4 °C (97.6 °F) (Temporal)   Resp 16   Ht 1.6 m (5' 3\")   Wt 63.5 kg (140 lb)   LMP  (LMP Unknown)   SpO2 99%   BMI 24.80 kg/m²   Physical Exam  Vitals and nursing note reviewed. "   Constitutional:       General: She is not in acute distress.     Appearance: Normal appearance. She is well-developed. She is not ill-appearing or toxic-appearing.   HENT:      Head: Normocephalic and atraumatic.      Right Ear: Hearing normal.      Left Ear: Hearing normal.   Cardiovascular:      Rate and Rhythm: Normal rate.   Pulmonary:      Effort: Pulmonary effort is normal.   Musculoskeletal:        Arms:       Comments: Patient has erythema and mild swelling to the distal aspect of the right fourth digit.  Neurovascular intact distally.  Less than 2 capillary refill.  The dorsum of the right fourth digit distally near the nailbed does appear to have superficial abrasion/cut with no foreign body and no necrosis.  No active drainage or discharge.  No induration or fluctuance.  Erythema does not spread proximally.  Patient has full flexion and extension besides slight limitation secondary to swelling distally.   Skin:     General: Skin is warm and dry.   Neurological:      Mental Status: She is alert.      Coordination: Coordination normal.   Psychiatric:         Mood and Affect: Mood normal.         Assessment/Plan:   Assessment    1. Infection of nail bed of finger of right hand  - TD Preservative Free =>8yo IM  - amoxicillin-clavulanate (AUGMENTIN) 875-125 MG Tab; Take 1 Tablet by mouth 2 times a day for 5 days.  Dispense: 10 Tablet; Refill: 0    Symptoms and presentation appear consistent with infection of the nailbed of the right ring finger.  We will treat with oral antibiotics.  Recommend patient continue with warm soaks and regular wound care instructions also discussed.  New bandage was placed today with antibiotic ointment.  No indication for incision and drainage at this time.  Patient would also like to update her tetanus shot which was last updated in 2015    Differential diagnosis, natural history, supportive care, and indications for immediate follow-up discussed.   Patient given instructions  and understanding of medications and treatment.    If not improving in 3-5 days, F/U with PCP or return to UC if symptoms worsen.    Patient agreeable to plan.          Please note that this dictation was created using voice recognition software. I have made every reasonable attempt to correct obvious errors, but I expect that there are errors of grammar and possibly content that I did not discover before finalizing the note.    Grayson Aquino PA-C

## 2023-10-16 ENCOUNTER — APPOINTMENT (OUTPATIENT)
Dept: ADMISSIONS | Facility: MEDICAL CENTER | Age: 58
End: 2023-10-16
Attending: ORTHOPAEDIC SURGERY
Payer: MEDICARE

## 2023-10-17 ENCOUNTER — ANESTHESIA (OUTPATIENT)
Dept: SURGERY | Facility: MEDICAL CENTER | Age: 58
End: 2023-10-17
Payer: MEDICARE

## 2023-10-17 ENCOUNTER — HOSPITAL ENCOUNTER (OUTPATIENT)
Facility: MEDICAL CENTER | Age: 58
End: 2023-10-17
Attending: ORTHOPAEDIC SURGERY | Admitting: ORTHOPAEDIC SURGERY
Payer: MEDICARE

## 2023-10-17 ENCOUNTER — ANESTHESIA EVENT (OUTPATIENT)
Dept: SURGERY | Facility: MEDICAL CENTER | Age: 58
End: 2023-10-17
Payer: MEDICARE

## 2023-10-17 VITALS
OXYGEN SATURATION: 93 % | RESPIRATION RATE: 28 BRPM | SYSTOLIC BLOOD PRESSURE: 129 MMHG | WEIGHT: 141.31 LBS | HEART RATE: 55 BPM | BODY MASS INDEX: 26.01 KG/M2 | HEIGHT: 62 IN | DIASTOLIC BLOOD PRESSURE: 55 MMHG | TEMPERATURE: 97.4 F

## 2023-10-17 PROBLEM — F11.90 CHRONIC, CONTINUOUS USE OF OPIOIDS: Status: ACTIVE | Noted: 2023-10-17

## 2023-10-17 LAB — PATHOLOGY CONSULT NOTE: NORMAL

## 2023-10-17 PROCEDURE — 160028 HCHG SURGERY MINUTES - 1ST 30 MINS LEVEL 3: Performed by: ORTHOPAEDIC SURGERY

## 2023-10-17 PROCEDURE — A9270 NON-COVERED ITEM OR SERVICE: HCPCS | Performed by: STUDENT IN AN ORGANIZED HEALTH CARE EDUCATION/TRAINING PROGRAM

## 2023-10-17 PROCEDURE — 700105 HCHG RX REV CODE 258: Performed by: ORTHOPAEDIC SURGERY

## 2023-10-17 PROCEDURE — 160035 HCHG PACU - 1ST 60 MINS PHASE I: Performed by: ORTHOPAEDIC SURGERY

## 2023-10-17 PROCEDURE — 25073 EXC FOREARM TUM DEEP 3 CM/>: CPT | Mod: RT | Performed by: ORTHOPAEDIC SURGERY

## 2023-10-17 PROCEDURE — 160048 HCHG OR STATISTICAL LEVEL 1-5: Performed by: ORTHOPAEDIC SURGERY

## 2023-10-17 PROCEDURE — 26055 INCISE FINGER TENDON SHEATH: CPT | Mod: F5 | Performed by: ORTHOPAEDIC SURGERY

## 2023-10-17 PROCEDURE — 160025 RECOVERY II MINUTES (STATS): Performed by: ORTHOPAEDIC SURGERY

## 2023-10-17 PROCEDURE — 700111 HCHG RX REV CODE 636 W/ 250 OVERRIDE (IP): Performed by: STUDENT IN AN ORGANIZED HEALTH CARE EDUCATION/TRAINING PROGRAM

## 2023-10-17 PROCEDURE — 700111 HCHG RX REV CODE 636 W/ 250 OVERRIDE (IP): Mod: JZ | Performed by: ORTHOPAEDIC SURGERY

## 2023-10-17 PROCEDURE — 160046 HCHG PACU - 1ST 60 MINS PHASE II: Performed by: ORTHOPAEDIC SURGERY

## 2023-10-17 PROCEDURE — 700111 HCHG RX REV CODE 636 W/ 250 OVERRIDE (IP): Mod: JZ | Performed by: STUDENT IN AN ORGANIZED HEALTH CARE EDUCATION/TRAINING PROGRAM

## 2023-10-17 PROCEDURE — 700102 HCHG RX REV CODE 250 W/ 637 OVERRIDE(OP): Performed by: STUDENT IN AN ORGANIZED HEALTH CARE EDUCATION/TRAINING PROGRAM

## 2023-10-17 PROCEDURE — 160039 HCHG SURGERY MINUTES - EA ADDL 1 MIN LEVEL 3: Performed by: ORTHOPAEDIC SURGERY

## 2023-10-17 PROCEDURE — 700101 HCHG RX REV CODE 250: Performed by: STUDENT IN AN ORGANIZED HEALTH CARE EDUCATION/TRAINING PROGRAM

## 2023-10-17 PROCEDURE — 160002 HCHG RECOVERY MINUTES (STAT): Performed by: ORTHOPAEDIC SURGERY

## 2023-10-17 PROCEDURE — 700101 HCHG RX REV CODE 250: Performed by: ORTHOPAEDIC SURGERY

## 2023-10-17 PROCEDURE — 160009 HCHG ANES TIME/MIN: Performed by: ORTHOPAEDIC SURGERY

## 2023-10-17 PROCEDURE — 88304 TISSUE EXAM BY PATHOLOGIST: CPT

## 2023-10-17 RX ORDER — CEFAZOLIN SODIUM 1 G/3ML
2 INJECTION, POWDER, FOR SOLUTION INTRAMUSCULAR; INTRAVENOUS ONCE
Status: COMPLETED | OUTPATIENT
Start: 2023-10-17 | End: 2023-10-17

## 2023-10-17 RX ORDER — BUPIVACAINE HYDROCHLORIDE 5 MG/ML
INJECTION, SOLUTION EPIDURAL; INTRACAUDAL
Status: DISCONTINUED | OUTPATIENT
Start: 2023-10-17 | End: 2023-10-17 | Stop reason: HOSPADM

## 2023-10-17 RX ORDER — OXYCODONE HCL 5 MG/5 ML
5 SOLUTION, ORAL ORAL
Status: COMPLETED | OUTPATIENT
Start: 2023-10-17 | End: 2023-10-17

## 2023-10-17 RX ORDER — BUPIVACAINE HYDROCHLORIDE 5 MG/ML
INJECTION, SOLUTION EPIDURAL; INTRACAUDAL
Status: DISCONTINUED
Start: 2023-10-17 | End: 2023-10-17 | Stop reason: HOSPADM

## 2023-10-17 RX ORDER — HYDRALAZINE HYDROCHLORIDE 20 MG/ML
5 INJECTION INTRAMUSCULAR; INTRAVENOUS
Status: DISCONTINUED | OUTPATIENT
Start: 2023-10-17 | End: 2023-10-17 | Stop reason: HOSPADM

## 2023-10-17 RX ORDER — DEXAMETHASONE SODIUM PHOSPHATE 4 MG/ML
INJECTION, SOLUTION INTRA-ARTICULAR; INTRALESIONAL; INTRAMUSCULAR; INTRAVENOUS; SOFT TISSUE PRN
Status: DISCONTINUED | OUTPATIENT
Start: 2023-10-17 | End: 2023-10-17 | Stop reason: SURG

## 2023-10-17 RX ORDER — LIDOCAINE HYDROCHLORIDE 20 MG/ML
INJECTION, SOLUTION EPIDURAL; INFILTRATION; INTRACAUDAL; PERINEURAL PRN
Status: DISCONTINUED | OUTPATIENT
Start: 2023-10-17 | End: 2023-10-17 | Stop reason: SURG

## 2023-10-17 RX ORDER — MEPERIDINE HYDROCHLORIDE 25 MG/ML
6.25 INJECTION INTRAMUSCULAR; INTRAVENOUS; SUBCUTANEOUS
Status: DISCONTINUED | OUTPATIENT
Start: 2023-10-17 | End: 2023-10-17 | Stop reason: HOSPADM

## 2023-10-17 RX ORDER — EPHEDRINE SULFATE 50 MG/ML
5 INJECTION, SOLUTION INTRAVENOUS
Status: DISCONTINUED | OUTPATIENT
Start: 2023-10-17 | End: 2023-10-17 | Stop reason: HOSPADM

## 2023-10-17 RX ORDER — ACETAMINOPHEN 500 MG
1000 TABLET ORAL ONCE
Status: COMPLETED | OUTPATIENT
Start: 2023-10-17 | End: 2023-10-17

## 2023-10-17 RX ORDER — ONDANSETRON 2 MG/ML
4 INJECTION INTRAMUSCULAR; INTRAVENOUS
Status: DISCONTINUED | OUTPATIENT
Start: 2023-10-17 | End: 2023-10-17 | Stop reason: HOSPADM

## 2023-10-17 RX ORDER — MIDAZOLAM HYDROCHLORIDE 1 MG/ML
INJECTION INTRAMUSCULAR; INTRAVENOUS PRN
Status: DISCONTINUED | OUTPATIENT
Start: 2023-10-17 | End: 2023-10-17 | Stop reason: SURG

## 2023-10-17 RX ORDER — OXYCODONE HCL 5 MG/5 ML
10 SOLUTION, ORAL ORAL
Status: COMPLETED | OUTPATIENT
Start: 2023-10-17 | End: 2023-10-17

## 2023-10-17 RX ORDER — HALOPERIDOL 5 MG/ML
1 INJECTION INTRAMUSCULAR
Status: DISCONTINUED | OUTPATIENT
Start: 2023-10-17 | End: 2023-10-17 | Stop reason: HOSPADM

## 2023-10-17 RX ORDER — SODIUM CHLORIDE, SODIUM LACTATE, POTASSIUM CHLORIDE, CALCIUM CHLORIDE 600; 310; 30; 20 MG/100ML; MG/100ML; MG/100ML; MG/100ML
INJECTION, SOLUTION INTRAVENOUS CONTINUOUS
Status: DISCONTINUED | OUTPATIENT
Start: 2023-10-17 | End: 2023-10-17 | Stop reason: HOSPADM

## 2023-10-17 RX ORDER — DIPHENHYDRAMINE HYDROCHLORIDE 50 MG/ML
12.5 INJECTION INTRAMUSCULAR; INTRAVENOUS
Status: DISCONTINUED | OUTPATIENT
Start: 2023-10-17 | End: 2023-10-17 | Stop reason: HOSPADM

## 2023-10-17 RX ORDER — HYDROMORPHONE HYDROCHLORIDE 2 MG/ML
INJECTION, SOLUTION INTRAMUSCULAR; INTRAVENOUS; SUBCUTANEOUS PRN
Status: DISCONTINUED | OUTPATIENT
Start: 2023-10-17 | End: 2023-10-17 | Stop reason: SURG

## 2023-10-17 RX ADMIN — OXYCODONE HYDROCHLORIDE 10 MG: 5 SOLUTION ORAL at 10:22

## 2023-10-17 RX ADMIN — DEXAMETHASONE SODIUM PHOSPHATE 8 MG: 4 INJECTION INTRA-ARTICULAR; INTRALESIONAL; INTRAMUSCULAR; INTRAVENOUS; SOFT TISSUE at 09:27

## 2023-10-17 RX ADMIN — MIDAZOLAM 2 MG: 1 INJECTION, SOLUTION INTRAMUSCULAR; INTRAVENOUS at 09:18

## 2023-10-17 RX ADMIN — SODIUM CHLORIDE, POTASSIUM CHLORIDE, SODIUM LACTATE AND CALCIUM CHLORIDE: 600; 310; 30; 20 INJECTION, SOLUTION INTRAVENOUS at 09:11

## 2023-10-17 RX ADMIN — FENTANYL CITRATE 50 MCG: 50 INJECTION, SOLUTION INTRAMUSCULAR; INTRAVENOUS at 10:22

## 2023-10-17 RX ADMIN — CEFAZOLIN 2 G: 1 INJECTION, POWDER, FOR SOLUTION INTRAMUSCULAR; INTRAVENOUS at 09:22

## 2023-10-17 RX ADMIN — LIDOCAINE HYDROCHLORIDE 100 MG: 20 INJECTION, SOLUTION EPIDURAL; INFILTRATION; INTRACAUDAL at 09:22

## 2023-10-17 RX ADMIN — HYDROMORPHONE HYDROCHLORIDE 0.5 MG: 2 INJECTION INTRAMUSCULAR; INTRAVENOUS; SUBCUTANEOUS at 09:18

## 2023-10-17 RX ADMIN — ACETAMINOPHEN 1000 MG: 500 TABLET, FILM COATED ORAL at 09:12

## 2023-10-17 RX ADMIN — PROPOFOL 150 MG: 10 INJECTION, EMULSION INTRAVENOUS at 09:22

## 2023-10-17 RX ADMIN — FENTANYL CITRATE 50 MCG: 50 INJECTION, SOLUTION INTRAMUSCULAR; INTRAVENOUS at 10:42

## 2023-10-17 ASSESSMENT — PAIN SCALES - GENERAL: PAIN_LEVEL: 4

## 2023-10-17 ASSESSMENT — PAIN DESCRIPTION - PAIN TYPE
TYPE: SURGICAL PAIN
TYPE: CHRONIC PAIN
TYPE: SURGICAL PAIN

## 2023-10-17 NOTE — LETTER
September 18, 2023    Patient Name: Jojo Walker  Surgeon Name: Dawyne Yuan M.D.  Surgery Facility: [facility] ([address] [place] [address])  Surgery Date: 10/17/2023    The time of your surgery is not final and may change up to and until the day of your surgery. You will be contacted 24-48 hours prior to your surgery date with your check-in and surgery time.    If you will not be at one of the below numbers please call the surgery scheduler at [phone]  Preferred Phone: [phone]    BEFORE YOUR SURGERY   Pre Registration and/or Lab Work must be done within and no earlier than 28 days prior to your surgery date. Your scheduled facility will contact you regarding all required preregistration and/or lab work. If you have not been contacted within 7 days of your scheduled procedure please call [facility] at [phone] for an appointment as soon as possible.    Instructions: Bring a list of all medications you are taking including the dosing and frequency.    DAY OF YOUR SURGERY  Nothing to eat or drink after midnight     Refrain from smoking any substance after midnight prior to surgery. Smoking may interfere with the anesthetic and frequently produces nausea during the recovery period.    Continue taking all lifesaving medications. Including the morning of your surgery with small sip of water.    Please do NOT take on the day of surgery:  Diuretics: examples- furosemide (Lasix), spironolactone, hydrochlorothiazide  ACE-inhibitors: examples- lisinopril, ramipril, enalapril  “ARBs”: examples- losartan, Olmesartan, valsartan    Please arrive at the hospital/surgery center at the check-in time provided.     An adult will need to bring you and take you home after your surgery.     AFTER YOUR SURGERY  Post op Appointment:   Date: 10/30   Time: 3PM   With: Dwayne Yuan MD   Location: [address] [place] [id number]    - Post Surgery - You will need someone to drive you  home       TIME OFF WORK  FMLA or Disability forms can be faxed directly to: [phone] or you may drop them off at [address] [place] [address]. Our office charges a $35.00 fee per form. Forms will be completed within 10 business days of drop off and payment received. For the status of your forms you may contact our disability office directly [phone].    MEDICATION INSTRUCTIONS **Please read section completely**    The following medications should be stopped a minimum of 10 days prior to surgery:  All over the counter, Supplements & Herbal medications    Anorectics: Phentermine (Adipex-P, Lomaira and Suprenza), Phentermine-topiramate (Qsymia), Bupropion-naltrexone (Contrave)    Opiod Partial Agonists/Opioid Antagonists: Buprenorphine (Subocone, Belbuca, Butrans, Probuphine Implant, Sublocade), Naltrexone (ReVia, Vivitrol), Naloxone    Amphetamines: Dextroamphetamine/Amphetamine (Adderall, Mydayis), Methylphenidate Hydrochloride (Concerta, Metadate, Methylin, Ritalin)    The following medications should be stopped 5 days prior to surgery:  Blood Thinners: Any Aspirin, Aspirin products, anti-inflammatories such as ibuprofen and any blood thinners such as Coumadin and Plavix. Please consult your prescribing physician if you are on life saving blood thinners, in regards to when to stop medications prior to surgery.     The following medications should be stopped a minimum of 3 days prior to surgery:  PDE-5 inhibitors: Sildenafil (Viagra), Tadalafil (Cialis), Vardenafil (Levitra), Avanafil (Stendra)    MAO Inhibitors: Rasagiline (Azilect), Selegiline (Eldepryl, Emsam, Selapar), Isocarboxazid (Marplan), Phenelzine (Nardil)         COVID and INFLUENZA NOTICE TO PATIENTS    Currently, the [place] [facility] does not routinely test patients for COVID-19 or Influenza prior to their elective surgery.  However, if patients develop the following symptoms prior to their surgery date, they should  voluntarily test for COVID-19 and Influenza and notify the surgical office of their condition and results.  The symptoms warranting testing would be any two of the following:    Fever (Temp above 100.4 F)  Chills  Cough  Shortness of breath or difficulty breathing  Fatigue  Myalgias (muscle or body aches)  Headache  Sore Throat  Congestion or Runny Nose  Nausea or vomiting  Diarrhea  New loss of taste or smell    Having these symptoms prior to surgery can significantly increase your risk of morbidity and mortality under anesthesia, which may compromise your health and require a transfer to a hospital for a higher level of care.  Therefore, it is advisable to notify the surgical team of any illness in order to get information for the appropriate time to delay the surgery to minimize these preventable risks.    Your health and safety are our number one priority at the [facility], and we are thankful that you entrust us with your care.  Please help us ensure the best possible surgical and anesthetic outcome by sharing appropriate health information with our perioperative team and staff.  We look forward to taking great care of you!    Thank you for your time and consideration on this matter.    Rehan Steele MD  Medical Director  Anesthesiologist  [facility]

## 2023-10-17 NOTE — ANESTHESIA PREPROCEDURE EVALUATION
Case: [id number] Date/Time: 10/17/23 0915    Procedures:       RIGHT WRIST VOLAR GANGLION CYST EXCISION, RIGHT HAND MASS EXCISION, RIGHT THUMB TRIGGER RELEASE      EXCISION, MASS, UPPER EXTREMITY      RELEASE, TRIGGER FINGER    Diagnosis:       Trigger thumb of right hand [M65.311]      Ganglion cyst of volar aspect of right wrist [M67.431]      Mass of right hand [R22.31]    Pre-op diagnosis: Trigger thumb of right hand [M65.311]Ganglion cyst of volar aspect of right wrist [M67.431]Mass of right hand [R22.31]    Location: [facility] ROOM 21 / SURGERY SAME DAY [facility]    Surgeons: Dwayne Yuan M.D.          Relevant Problems   Other   (positive) Chronic, continuous use of opioids   (positive) Non-radiographic axial spondyloarthritis (HCC)       Physical Exam    Airway   Mallampati: II  TM distance: >3 FB  Neck ROM: full       Cardiovascular - normal exam  Rhythm: regular  Rate: normal  (-) murmur     Dental - normal exam           Pulmonary - normal exam  Breath sounds clear to auscultation     Abdominal    Neurological - normal exam                 Anesthesia Plan    ASA 2       Plan - general       Airway plan will be LMA          Induction: intravenous    Postoperative Plan: Postoperative administration of opioids is intended.    Pertinent diagnostic labs and testing reviewed    Informed Consent:    Anesthetic plan and risks discussed with patient.

## 2023-10-17 NOTE — ANESTHESIA PROCEDURE NOTES
Airway    Date/Time: 10/17/2023 9:24 AM    Performed by: Melchor Lutz M.D.  Authorized by: Melchor Lutz M.D.    Location:  OR  Urgency:  Elective  Indications for Airway Management:  Anesthesia      Spontaneous Ventilation: absent    Sedation Level:  Deep  Preoxygenated: Yes    Mask Difficulty Assessment:  0 - not attempted  Final Airway Type:  Supraglottic airway  Final Supraglottic Airway:  Standard LMA    SGA Size:  4  Number of Attempts at Approach:  1

## 2023-10-17 NOTE — DISCHARGE INSTR - OTHER INFO
"{[facility] additional discharge instructions:70230213::\"Minimal activity at home for *** days\"}                                                                                                          DR. ROSAS POST-OPERATIVE INSTRUCTIONS    You have just undergone a sugery by Dr. Rosas in the operating room.  It is our wish that your postoperative recovery be as quick and comfortable as possible.  Please carefully review the following items that are important for your recovery.    After any operation, a certain degree of pain is to be expected. Take Advil (ibuprofen) and Extra Strength Tylenol as first line medications for mild to moderate pain. Taking each one every 6 hours, and staggering them so that you are taking one medicine every 3 hours, is the most effective. Refer to dosing instructions on the bottle, but in general ibuprofen dose is 600-800mg and Tylenol dose is 500-1000mg. For most small procedures, this should be enough to keep you comfortable. Take these medications until your followup visit. You may have been given a small prescription for stronger pain medicine which will help relieve more severe pain.  Pain medicine may make you drowsy so please keep this in mind.  Do not drive while taking pain medicine.      When you go home, please keep your operated arm elevated at all times (above the level of your heart).  If you do this, your swelling will resolve more quickly and your pain will be improve more quickly as well. You may also place an ice pack over your dressing or splint to help with swelling and pain.    It is also very important to keep your fingers moving after most procedures, unless you had a tendon repair or fracture repair in which case you will be in a splint. Keep all of your fingers moving through a full range of motion to prevent stiffness.    For small hand procedures such as carpal tunnel release, trigger finger release, and cyst excision, the dressing that you have on your extremity " should remain on for 3-4 days. It may then be removed, you can wash the incision gently with soap and water, and keep the incision covered with a band-aid or similar clean dressing. For larger procedures or if you have a splint on, these should remain on until follow up or as specifically instructed. If you feel that your dressing is too tight during the first 3 days after surgery, you may loosen it. It is normal to see minor staining on the dressing after surgery. If there is significant bleeding, you are advised to call the office during regular office hours to have this checked.  Make sure that your dressing is kept dry at all times.  You can take a shower if you cover your arm with a plastic bag. If your dressing gets wet, replace it with sterile dressing that you can obtain from your local drug store.    Follow-up appointments can usually be found on the pre-op paperwork if not please call our office for a follow-up appointment, [phone]. Follow up after surgery is typically 10-14 days, unless you were specifically instructed otherwise. The sutures will be removed and you may be asked to see a hand therapist to optimize your functional result. Each of the hand therapists that you will be referred to have received special training in the care of the hand and upper extremity.    If you have questions regarding your surgery postop that you feel requires attention, please call the office at [phone] during business hours, or [phone] after hours for the answering service. If you feel that you have a surgical emergency postoperatively that requires immediate attention, please call the above numbers or go to the Emergency Department and ask for the Orthopedic Surgeon on call.

## 2023-10-17 NOTE — DISCHARGE INSTRUCTIONS
Keep dressing clean and dry   Elevate extremity   Keep dressing on until next appointment   Encourage moving all fingers    HOME CARE INSTRUCTIONS    ACTIVITY: Rest and take it easy for the first 24 hours.  A responsible adult is recommended to remain with you during that time.  It is normal to feel sleepy.  We encourage you to not do anything that requires balance, judgment or coordination.    FOR 24 HOURS DO NOT:  Drive, operate machinery or run household appliances.  Drink beer or alcoholic beverages.  Make important decisions or sign legal documents.    DIET: To avoid nausea, slowly advance diet as tolerated, avoiding spicy or greasy foods for the first day.  Add more substantial food to your diet according to your physician's instructions.  Babies can be fed formula or breast milk as soon as they are hungry.  INCREASE FLUIDS AND FIBER TO AVOID CONSTIPATION    MEDICATIONS: Resume taking daily medication.  Take prescribed pain medication with food.  If no medication is prescribed, you may take non-aspirin pain medication if needed.  PAIN MEDICATION CAN BE VERY CONSTIPATING.  Take a stool softener or laxative such as senokot, pericolace, or milk of magnesia if needed.    Last pain medication given: Tylenol given at 9:15 AM, Oxycodone given at 10:22 AM    A follow-up appointment should be arranged with your doctor; call to schedule.    You should CALL YOUR PHYSICIAN if you develop:  Fever greater than 101 degrees F.  Pain not relieved by medication, or persistent nausea or vomiting.  Excessive bleeding (blood soaking through dressing) or unexpected drainage from the wound.  Extreme redness or swelling around the incision site, drainage of pus or foul smelling drainage.  Inability to urinate or empty your bladder within 8 hours.  Problems with breathing or chest pain.    You should call 911 if you develop problems with breathing or chest pain.  If you are unable to contact your doctor or surgical center, you should  go to the nearest emergency room or urgent care center.  Physician's telephone #: [phone]    MILD FLU-LIKE SYMPTOMS ARE NORMAL.  YOU MAY EXPERIENCE GENERALIZED MUSCLE ACHES, THROAT IRRITATION, HEADACHE AND/OR SOME NAUSEA.    If any questions arise, call your doctor.  If your doctor is not available, please feel free to call the Surgical Center at [phone].  The Center is open Monday through Friday from 7AM to 7PM.      A registered nurse may call you a few days after your surgery to see how you are doing after your procedure.    You may also receive a survey in the mail within the next two weeks and we ask that you take a few moments to complete the survey and return it to us.  Our goal is to provide you with very good care and we value your comments.     Depression / Suicide Risk    As you are discharged from this [facility] facility, it is important to learn how to keep safe from harming yourself.    Recognize the warning signs:  Abrupt changes in personality, positive or negative- including increase in energy   Giving away possessions  Change in eating patterns- significant weight changes-  positive or negative  Change in sleeping patterns- unable to sleep or sleeping all the time   Unwillingness or inability to communicate  Depression  Unusual sadness, discouragement and loneliness  Talk of wanting to die  Neglect of personal appearance   Rebelliousness- reckless behavior  Withdrawal from people/activities they love  Confusion- inability to concentrate     If you or a loved one observes any of these behaviors or has concerns about self-harm, here's what you can do:  Talk about it- your feelings and reasons for harming yourself  Remove any means that you might use to hurt yourself (examples: pills, rope, extension cords, firearm)  Get professional help from the community (Mental Health, Substance Abuse, psychological counseling)  Do not be alone:Call your Safe Contact- someone whom you trust who  will be there for you.  Call your local CRISIS HOTLINE 240-6985 or 878-473-2120  Call your local Children's Mobile Crisis Response Team [place] (801) 504-3847 or www.Zookal  Call the toll free National Suicide Prevention Hotlines   National Suicide Prevention Lifeline 498-573-MAVK (8427)  [facility] Line Network 800-JPIKWMH (539-2601)    I acknowledge receipt and understanding of these Home Care instructions.

## 2023-10-17 NOTE — ANESTHESIA POSTPROCEDURE EVALUATION
Patient: Jojo Walker    Procedure Summary     Date: 10/17/23 Room / Location: [facility] ROOM 21 / SURGERY SAME DAY [facility]    Anesthesia Start: 0918 Anesthesia Stop: 1009    Procedures:       RIGHT WRIST VOLAR GANGLION CYST EXCISION, RIGHT HAND MASS EXCISION, RIGHT THUMB TRIGGER RELEASE (Right: Wrist)      EXCISION, MASS, UPPER EXTREMITY (Right: Wrist)      RELEASE, TRIGGER FINGER (Right: Finger) Diagnosis:       Trigger thumb of right hand      Ganglion cyst of volar aspect of right wrist      Mass of right hand      (Trigger thumb of right hand, Ganglion cyst of volar aspect of right wrist, Mass of right hand)    Surgeons: Dwayne Yuan M.D. Responsible Provider: Melchor Lutz M.D.    Anesthesia Type: general ASA Status: 2          Final Anesthesia Type: general  Last vitals  BP   Blood Pressure: (!) 150/67    Temp   36.3 °C (97.4 °F)    Pulse   (!) 55   Resp   17    SpO2   94 %      Anesthesia Post Evaluation    Patient location during evaluation: PACU  Patient participation: complete - patient participated  Level of consciousness: awake and alert  Pain score: 4    Airway patency: patent  Anesthetic complications: no  Cardiovascular status: hemodynamically stable  Respiratory status: acceptable  Hydration status: euvolemic    PONV: none          There were no known notable events for this encounter.     Nurse Pain Score: 7 (NPRS)

## 2023-10-17 NOTE — OR NURSING
Introduced self to patient and discussed plan of care. Surgical consent signed. IV inserted without difficulty. Checked oxygen tank on [facility], 274 psi. Pre op complete.

## 2023-10-17 NOTE — H&P
Surgery Orthopedic History & Physical Note    Date  10/17/2023    Primary Care Physician  Mera Mancia D.O.      Pre-Op Diagnosis Codes:     * Trigger thumb of right hand [M65.311]     * Ganglion cyst of volar aspect of right wrist [M67.431]     * Mass of right hand [R22.31]    HPI  This is a 58 y.o. female who presented with Subjective   Patient ID:  Jojo Walker is a 58 y.o. female.    Chief Complaint:  Pain of the Right Hand    Last Surgery: No surgery found on No surgery found    HPI  Patient is seen in clinic today for evaluation of right hand pain. I last saw her in January for a volar ganglion cyst and we had scheduled surgery for a cyst removal as well as a trigger thumb release. This never got scheduled and now she returns for worsening pain in the palm of the hand now. She states her cyst may be in the palm. Her trigger thumb still bothers her and catches every now and then.     Review of Systems    Physical Exam  General:  Well appearing, in no acute distress. Appropriate and cooperative with the exam.  HEENT: Normocephalic, atraumatic. Cranial nerves II-XII are grossly intact.  Cardiovascular: 2+ distal pulses. No cyanosis, clubbing, or edema.  Pulmonary: Breathing comfortably on room air without labor.  Abdomen: Soft and unremarkable.  Skin: No rashes, jaundice, or cyanosis.  Lymphatic: No epitrochlear lymphadenopathy.  Spine:  Clinically midline.   Gait:  Patient ambulates with assistance of a cane.  Musculoskeletal: There is a palpable mass in the thenar muscle that seems to be vascular in nature due to its purple tinge. There is a volar ganglion cyst from the radiocarpal joint. There is tenderness at the A1 pulley with palpable triggering of the right thumb.  Neurological: Sensation intact to light touch median, radial, ulnar nerves. Neurovascularly intact, distally.     Imaging  No new imaging today    Encounter Diagnoses:   Right hand mass  Right wrist volar ganglion cyst  Right  trigger thumb    Plan  I had a thorough discussion with the patient regarding the diagnosis including both operative and nonoperative treatment.  After obtaining consent from the patient, we will proceed with operative management. I recommended an excise volar ganglion cyst, excise right hand mass, and right thumb trigger release, repairs as indicated.     Risks of surgery include, but not limited to, wound problems, infection, nerve injury, vascular injury, need for further surgery. There is also risk for weakness, stiffness, blood clots, recurrence of any deformity and chance for reinjury. I discussed the risks/ benefits/ complications associated with narcotic use including the potential for addiction. All of the patient's questions were answered today. We will schedule this in the near future at her convenience. I will follow up with her postoperatively.    Orders Placed This Encounter    Surgical Case Request: EXCISION, CYST, UPPER EXTREMITY VOLAR GANGLION WRIST, EXCISION, MASS, UPPER EXTREMITY HAND, RELEASE, TRIGGER THUMB     No follow-ups on file.    Steffen WEBBER, am scribing for, and in the presence of Dwayne Yuan MD.    I, Dwayne Yuan MD, personally performed the services described in this documentation, as scribed by, Steffen Palomo, in my presence. I do hereby attest this information is true, accurate, and complete to the best of my knowledge.       Past Medical History:   Diagnosis Date    Anesthesia     PONV after cholecystectomy only    Arthritis     ANKLES AND KNEES and hands,     Attention deficit hyperactivity disorder (ADHD) 1/18/2010     ICD-10 transition    Cholecystitis 6/3/2013    Congenital talipes equinovarus deformity of left foot 2/29/2016    Depressive disorder, not elsewhere classified 9/27/2010    Elevated antinuclear antibody (TARI) level 7/24/2013    Encounter for screening colonoscopy 7/24/2017    Normal Examination    History of ureter stent 4/1/16 4/2/2016    Inguinal  lymphadenopathy 7/24/2013    Joint pain 12/10/2019    low back,hips, sciatica, left foot, 3/10    Joint swelling 7/24/2013    Osteopenia     Personal history of venous thrombosis and embolism     left lung, PE after hysterectomy    Scoliosis     slight    Sjogren's disease (HCC)     sjogrens disease type B    Vitamin d deficiency 7/24/2013       Past Surgical History:   Procedure Laterality Date    INCISION AND DRAINAGE ORTHOPEDIC Left 2/17/2020    Procedure: INCISION AND DRAINAGE, WOUND, BY ORTHOPEDICS- ACHILLES DEBRIDE/WOUND REVISION AND INCISIONAL VAC;  Surgeon: Home Bhandari M.D.;  Location: [facility];  Service: Orthopedics    TENDON REPAIR Left 2/17/2020    Procedure: REPAIR, TENDON- FLEXOR HALLUCIS LONGUS RELEASE 5th toe;  Surgeon: Home Bhandari M.D.;  Location: [facility];  Service: Orthopedics    ORTHOPEDIC OSTEOTOMY Left 12/27/2019    Procedure: OSTEOTOMY, ORTHOPEDIC-MIDFOOT, HERRING;  Surgeon: Home Bhandari M.D.;  Location: [facility];  Service: Orthopedics    COLONOSCOPY - ENDO  7/24/2017    Procedure: COLONOSCOPY - ENDO;  Surgeon: Melchor Malik M.D.;  Location: SURGERY [facility];  Service:     HARDWARE REMOVAL ORTHO Left 5/19/2017    Procedure: HARDWARE REMOVAL LEFT FOOT;  Surgeon: Easton Harrison M.D.;  Location: SURGERY [facility];  Service:     URETEROSCOPY Right 4/1/2016    Procedure: Right retrograde pyelogram, stent placement ;  Surgeon: Alan Nunes M.D.;  Location: SURGERY [facility];  Service:     HARDWARE REMOVAL ORTHO Left 1/15/2016    Procedure: HARDWARE REMOVAL LEFT FOOT;  Surgeon: Easton Harrison M.D.;  Location: SURGERY [facility];  Service:     ERIC BY LAPAROSCOPY  6/4/2013    Performed by Home Oneal M.D. at [facility]    HARDWARE REMOVAL ORTHO  6/30/2010    Performed by HOME BHANDARI at SURGERY SAME DAY [facility]    MASS EXCISION ORTHO  8/27/08    Performed by HOME BHANDARI at SURGERY SAME DAY [facility]  ORS    BREAST RECONSTRUCTION  1996    FOOT SURGERY Bilateral     Multiple    HYSTERECTOMY LAPAROSCOPY  ~    OOPHORECTOMY Bilateral ~    TOSIN Perez    OTHER ORTHOPEDIC SURGERY      CLUB FEET REPAIR reports multiple surgeries    PRIMARY C SECTION       x 3 //       No current facility-administered medications for this encounter.     Current Outpatient Medications   Medication Sig Dispense Refill    B Complex Vitamins (B COMPLEX PO) Take 1 Tablet by mouth every day.      escitalopram (LEXAPRO) 10 MG Tab Take 1 Tablet by mouth every morning. 90 Tablet 0    estradiol (ESTRACE) 0.5 MG tablet TAKE 1 TABLET EVERY DAY (Patient not taking: Reported on 10/5/2023) 90 Tablet 1    Adalimumab 40 MG/0.4ML Pen-injector Kit Inject 40 mg under the skin.      HUMIRA PEN 40 MG/0.4ML Pen-injector Kit  (Patient not taking: Reported on 10/5/2023)      cevimeline (EVOXAC) 30 MG capsule Take 30 mg by mouth. (Patient not taking: Reported on 10/16/2023)      diclofenac sodium (VOLTAREN) 1 % Gel       diclofenac sodium (VOLTAREN) 1 % Gel  (Patient not taking: Reported on 10/5/2023)      estradiol (ESTRACE) 0.5 MG tablet 5 mg.      gabapentin (NEURONTIN) 100 MG Cap Take  by mouth. (Patient not taking: Reported on 10/5/2023)      triamcinolone acetonide (ORALONE) 0.1 % Paste APPLY SPARINGLY TO AFFECTED AREA 3 TIMES A DAY. (Patient not taking: Reported on 10/5/2023)      OZEMPIC, 1 MG/DOSE, 4 MG/3ML Solution Pen-injector       Semaglutide,0.25 or 0.5MG/DOS, (OZEMPIC, 0.25 OR 0.5 MG/DOSE,) 2 MG/3ML Solution Pen-injector 50 mg. (Patient not taking: Reported on 10/5/2023)      COSENTYX SENSOREADY, 300 MG, 150 MG/ML Solution Auto-injector  (Patient not taking: Reported on 10/16/2023)      ADDERALL, 30MG, 30 MG tablet       amphetamine-dextroamphetamine (ADDERALL, 30MG,) 30 MG tablet  (Patient not taking: Reported on 10/5/2023)      zolpidem (AMBIEN) 5 MG Tab 5 mg. (Patient not taking: Reported on 10/5/2023)      zolpidem  (AMBIEN) 10 MG Tab  (Patient not taking: Reported on 10/5/2023)      Semaglutide, 1 MG/DOSE, 2 MG/1.5ML Solution Pen-injector Inject 1 mg under the skin every 7 days. (Patient not taking: Reported on 10/5/2023) 6 mL 3    escitalopram (LEXAPRO) 20 MG tablet Take 1 Tablet by mouth every day. (Patient not taking: Reported on 10/5/2023) 100 Tablet 1    NON SPECIFIED every day. Compounded Mouth Wash   C-dyphenhyd 0.125% - hydrocort 0.125% - tetracyc 1% - nyst 7500IU/mL - lido 1% (Patient not taking: Reported on 10/5/2023)      ADDERALL, 20MG, 20 MG Tab TAKE 1 TABLET BY MOUTH EVERY MORNING FOR 28 DAYS (Patient not taking: Reported on 10/5/2023)      zolpidem (AMBIEN) 5 MG Tab Take 5 mg by mouth at bedtime as needed.      diclofenac DR (VOLTAREN) 75 MG Tablet Delayed Response Take 75 mg by mouth.      Secukinumab 150 MG/ML Solution Auto-injector Inject 150 mg under the skin. (Patient not taking: Reported on 10/5/2023)      Cholecalciferol (VITAMIN D3 PO) Take 1,000 Units by mouth every evening.      oxyCODONE-acetaminophen (PERCOCET) 7.5-325 MG per tablet Take 1 Tab by mouth 4 times a day.      fluticasone (FLONASE) 50 MCG/ACT nasal spray Spray 1 Spray in nose every day.      Carboxymethylcellulose Sodium (LUBRICANT EYE DROPS OP) Place 2 Drops in both eyes as needed (Dry eye).         Social History     Socioeconomic History    Marital status: Single     Spouse name: Not on file    Number of children: Not on file    Years of education: Not on file    Highest education level: Some college, no degree   Occupational History    Not on file   Tobacco Use    Smoking status: Former     Current packs/day: 0.00     Average packs/day: 0.5 packs/day for 20.0 years (10.0 ttl pk-yrs)     Types: Cigarettes     Start date: 1980     Quit date: 2000     Years since quittin.8    Smokeless tobacco: Never   Vaping Use    Vaping Use: Never used   Substance and Sexual Activity    Alcohol use: No    Drug use: No    Sexual activity:  Not Currently     Birth control/protection: Abstinence   Other Topics Concern     Service No    Blood Transfusions No    Caffeine Concern Yes    Occupational Exposure No    Hobby Hazards No    Sleep Concern Yes    Stress Concern Yes    Weight Concern Yes    Special Diet Yes    Back Care Yes    Exercise No    Bike Helmet No    Seat Belt Yes    Self-Exams No   Social History Narrative    Single, on disability due to leg surgeries     Social Determinants of Health     Financial Resource Strain: Low Risk  (3/9/2023)    Overall Financial Resource Strain (CARDIA)     Difficulty of Paying Living Expenses: Not very hard   Food Insecurity: No Food Insecurity (3/9/2023)    Hunger Vital Sign     Worried About Running Out of Food in the Last Year: Never true     Ran Out of Food in the Last Year: Never true   Transportation Needs: No Transportation Needs (3/9/2023)    PRAPARE - Transportation     Lack of Transportation (Medical): No     Lack of Transportation (Non-Medical): No   Physical Activity: Inactive (3/9/2023)    Exercise Vital Sign     Days of Exercise per Week: 0 days     Minutes of Exercise per Session: 20 min   Stress: Stress Concern Present (3/9/2023)    [employer] [place] [employer] - Occupational Stress Questionnaire     Feeling of Stress : To some extent   Social Connections: Socially Isolated (3/9/2023)    Social Connection and Isolation Panel [NHANES]     Frequency of Communication with Friends and Family: Twice a week     Frequency of Social Gatherings with Friends and Family: Never     Attends Muslim Services: Never     Active Member of Clubs or Organizations: No     Attends Club or Organization Meetings: Never     Marital Status:    Intimate Partner Violence: Not on file   Housing Stability: Unknown (3/9/2023)    Housing Stability Vital Sign     Unable to Pay for Housing in the Last Year: Patient refused     Number of Places Lived in the Last Year: Not on file     Unstable  Housing in the Last Year: No       Family History   Problem Relation Age of Onset    GI Disease Mother         Diverticulitis    Alcohol/Drug Sister     Cancer Maternal Grandfather         lung/melanoma    Cancer Maternal Grandmother         stomach    Cancer Maternal Uncle         brain    Dementia Paternal Grandfather        Allergies  Sulfa drugs, Codeine, Levaquin, Tape, and Hydrocodone    Review of Systems  Negative    Physical Exam    Vital Signs                          Labs:                    Radiology:  No orders to display         Assessment/Plan:  Pre-Op Diagnosis Codes:     * Trigger thumb of right hand [M65.311]     * Ganglion cyst of volar aspect of right wrist [M67.431]     * Mass of right hand [R22.31]  Procedure(s):  RIGHT WRIST VOLAR GANGLION CYST EXCISION, RIGHT HAND MASS EXCISION, RIGHT THUMB TRIGGER RELEASE  EXCISION, MASS, UPPER EXTREMITY  RELEASE, TRIGGER FINGER

## 2023-10-17 NOTE — ANESTHESIA TIME REPORT
Anesthesia Start and Stop Event Times     Date Time Event    10/17/2023 0908 Ready for Procedure     0918 Anesthesia Start     1009 Anesthesia Stop        Responsible Staff  10/17/23    Name Role Begin End    Melchor Lutz M.D. Anesth 0918 1009        Overtime Reason:  no overtime (within assigned shift)    Comments:

## 2023-10-17 NOTE — OR NURSING
1007 received patient from OR. Report from Anesthesiologist and OR RN. Patient on 8L at 100 % O2. VSS. Monitor connected. No airway in place. S/P right wrist cyst/mass excision and right thumb trigger release, incision observed on right hand/wrist. Dressing in place, clean, dry, and intact. Cap refill 3<3 seconds. Arm elevated on pillow ice pack applied.     1015 Patient awake, complains of no pain or nausea at this time.     1018 Patient tolerating oral fluids well.     1022 Fentanyl and oxycodone given for pain.     1042 Subsequent dose of Fentanyl given for pain.     1046 Daughter notified about patients status and plan of care.     1102 Daughter at bedside.     1104 Discharge instructions covered with daughter, verbalizes understanding. All questions answered at this time.     1109 Patient getting dressed wish assistance from daughter.     1123 IV removed. Patient escorted out to responsible adult via wheelchair by RN. Belongings with patient, ambulated with steady gait. Discharge paperwork and instructions reviewed, signed, and sent with patient.

## 2023-10-18 NOTE — OP REPORT
DATE OF SERVICE:  10/17/2023     PREOPERATIVE DIAGNOSES:  1.  Mass, right wrist.  2.  Right trigger thumb.     POSTOPERATIVE DIAGNOSES:  1.  Mass, right wrist.  2.  Right trigger thumb.     PROCEDURES:  1.  Excision of mass, right wrist.  Measures approximately 4 x 1 cm  2.  Right trigger thumb release.     SURGEON:  Dwayne Yuan MD     ANESTHESIA:  General.     ESTIMATED BLOOD LOSS:  Minimal.     DRAINS:  None.     COMPLICATIONS:  None.     INDICATIONS:  The patient is a female who has a volar mass as well as a   trigger thumb, felt to benefit from the above-mentioned surgery.  Risks,   benefits, complications, and alternatives were explained to the patient.  All   questions were answered.  No guarantees given.  Signed consent was obtained.     DESCRIPTION OF PROCEDURE:  The patient was taken to the operating room, laid   supine on the operating table.  All bony prominences well padded.  Adequate   general was obtained without difficulty.  Right upper extremity was prepped   and draped in the usual sterile fashion using ChloraPrep.  Limb was   exsanguinated with elevation and tourniquet raised.  Total tourniquet time was   about a half hour.  Initially started with the trigger thumb release, made an   incision at the MCP joint of the thumb through skin and subcutaneous tissues,   bluntly dissected.  The flexor tendon sheath was identified, sharply incised,   carried proximally and distally, found to be a nice release.  Tendon tracked   nicely with no catching.  Wound irrigated, closed with nylon in simple   fashion.  We then made an incision at the volar wrist ganglion cyst through   skin and subcutaneous tissues, bluntly dissected.  I retracted the radial   artery as well as the FCR tendon in the interval and looking at the cyst,   there was also a mass in the thenar eminence that I subsequently made an   incision over as well.  It appeared that the mass went from the wrist joint   area into the thenar muscle  area.  I then dissected around.  I did tract the   mass.  I did take it off the scaphotrapezium joint in its entirety.  I then   dissected out and it did track since it was a little bit atypical and not   completely normal ganglion cyst.  I did pull.  Once I dissected out through a   separate incision and the thenar eminence released and I was able to pull the   mask back through into the volar wrist incision, removed in its entirety.  I   did send it to pathology.  The wounds were irrigated.  Tourniquet was let   down.  Hemostasis was obtained with electrocautery.  It was subsequently   closed with Vicryl to subcutaneous, skin with nylon.  Thenar eminence wound   was closed with nylon.  Local without epinephrine was injected.  Sterile   dressing, Xeroform, 4x4's, Sof-Rol, and a resting thumb spica splint was   applied.  All needle and sponge counts were correct.  The patient tolerated   the procedure well and was transferred to recovery room in stable condition.        ______________________________  MD DEREK BARRY/TOM    DD:  10/18/2023 06:53  DT:  10/18/2023 07:29    Job#:  [id number]

## 2023-11-16 ENCOUNTER — HOSPITAL ENCOUNTER (OUTPATIENT)
Dept: LAB | Facility: MEDICAL CENTER | Age: 58
End: 2023-11-16
Attending: INTERNAL MEDICINE
Payer: MEDICARE

## 2023-11-16 LAB
ALBUMIN SERPL BCP-MCNC: 4.4 G/DL (ref 3.2–4.9)
ALBUMIN/GLOB SERPL: 1.5 G/DL
ALP SERPL-CCNC: 75 U/L (ref 30–99)
ALT SERPL-CCNC: 17 U/L (ref 2–50)
ANION GAP SERPL CALC-SCNC: 8 MMOL/L (ref 7–16)
AST SERPL-CCNC: 18 U/L (ref 12–45)
BASOPHILS # BLD AUTO: 0.7 % (ref 0–1.8)
BASOPHILS # BLD: 0.04 K/UL (ref 0–0.12)
BILIRUB SERPL-MCNC: 0.2 MG/DL (ref 0.1–1.5)
BUN SERPL-MCNC: 14 MG/DL (ref 8–22)
CALCIUM ALBUM COR SERPL-MCNC: 9 MG/DL (ref 8.5–10.5)
CALCIUM SERPL-MCNC: 9.3 MG/DL (ref 8.5–10.5)
CHLORIDE SERPL-SCNC: 99 MMOL/L (ref 96–112)
CO2 SERPL-SCNC: 28 MMOL/L (ref 20–33)
CREAT SERPL-MCNC: 0.87 MG/DL (ref 0.5–1.4)
CRP SERPL HS-MCNC: 0.6 MG/DL (ref 0–0.75)
EOSINOPHIL # BLD AUTO: 0.08 K/UL (ref 0–0.51)
EOSINOPHIL NFR BLD: 1.4 % (ref 0–6.9)
ERYTHROCYTE [DISTWIDTH] IN BLOOD BY AUTOMATED COUNT: 45.4 FL (ref 35.9–50)
ERYTHROCYTE [SEDIMENTATION RATE] IN BLOOD BY WESTERGREN METHOD: 23 MM/HOUR (ref 0–25)
GFR SERPLBLD CREATININE-BSD FMLA CKD-EPI: 77 ML/MIN/1.73 M 2
GLOBULIN SER CALC-MCNC: 3 G/DL (ref 1.9–3.5)
GLUCOSE SERPL-MCNC: 83 MG/DL (ref 65–99)
HCT VFR BLD AUTO: 38.5 % (ref 37–47)
HGB BLD-MCNC: 12.3 G/DL (ref 12–16)
IMM GRANULOCYTES # BLD AUTO: 0.02 K/UL (ref 0–0.11)
IMM GRANULOCYTES NFR BLD AUTO: 0.3 % (ref 0–0.9)
LYMPHOCYTES # BLD AUTO: 2.21 K/UL (ref 1–4.8)
LYMPHOCYTES NFR BLD: 37.9 % (ref 22–41)
MCH RBC QN AUTO: 28.9 PG (ref 27–33)
MCHC RBC AUTO-ENTMCNC: 31.9 G/DL (ref 32.2–35.5)
MCV RBC AUTO: 90.4 FL (ref 81.4–97.8)
MONOCYTES # BLD AUTO: 0.29 K/UL (ref 0–0.85)
MONOCYTES NFR BLD AUTO: 5 % (ref 0–13.4)
NEUTROPHILS # BLD AUTO: 3.19 K/UL (ref 1.82–7.42)
NEUTROPHILS NFR BLD: 54.7 % (ref 44–72)
NRBC # BLD AUTO: 0 K/UL
NRBC BLD-RTO: 0 /100 WBC (ref 0–0.2)
PLATELET # BLD AUTO: 348 K/UL (ref 164–446)
PMV BLD AUTO: 9.7 FL (ref 9–12.9)
POTASSIUM SERPL-SCNC: 3.8 MMOL/L (ref 3.6–5.5)
PROT SERPL-MCNC: 7.4 G/DL (ref 6–8.2)
RBC # BLD AUTO: 4.26 M/UL (ref 4.2–5.4)
SODIUM SERPL-SCNC: 135 MMOL/L (ref 135–145)
WBC # BLD AUTO: 5.8 K/UL (ref 4.8–10.8)

## 2023-11-16 PROCEDURE — 36415 COLL VENOUS BLD VENIPUNCTURE: CPT

## 2023-11-16 PROCEDURE — 86480 TB TEST CELL IMMUN MEASURE: CPT

## 2023-11-16 PROCEDURE — 85652 RBC SED RATE AUTOMATED: CPT

## 2023-11-16 PROCEDURE — 86140 C-REACTIVE PROTEIN: CPT

## 2023-11-16 PROCEDURE — 80053 COMPREHEN METABOLIC PANEL: CPT

## 2023-11-16 PROCEDURE — 85025 COMPLETE CBC W/AUTO DIFF WBC: CPT

## 2023-11-17 LAB
GAMMA INTERFERON BACKGROUND BLD IA-ACNC: 0.03 IU/ML
M TB IFN-G BLD-IMP: NEGATIVE
M TB IFN-G CD4+ BCKGRND COR BLD-ACNC: 0.07 IU/ML
MITOGEN IGNF BCKGRD COR BLD-ACNC: >10 IU/ML
QFT TB2 - NIL TBQ2: 0.11 IU/ML

## 2024-03-25 PROBLEM — M25.372 ANKLE INSTABILITY, LEFT: Status: ACTIVE | Noted: 2024-03-25

## 2024-04-01 ENCOUNTER — APPOINTMENT (OUTPATIENT)
Dept: ADMISSIONS | Facility: MEDICAL CENTER | Age: 59
End: 2024-04-01
Attending: ORTHOPAEDIC SURGERY
Payer: MEDICARE

## 2024-04-05 ENCOUNTER — PRE-ADMISSION TESTING (OUTPATIENT)
Dept: ADMISSIONS | Facility: MEDICAL CENTER | Age: 59
End: 2024-04-05
Attending: ORTHOPAEDIC SURGERY
Payer: MEDICARE

## 2024-04-06 ENCOUNTER — HOSPITAL ENCOUNTER (OUTPATIENT)
Dept: RADIOLOGY | Facility: MEDICAL CENTER | Age: 59
End: 2024-04-06
Attending: STUDENT IN AN ORGANIZED HEALTH CARE EDUCATION/TRAINING PROGRAM
Payer: MEDICARE

## 2024-04-06 DIAGNOSIS — M54.2 CERVICALGIA: ICD-10-CM

## 2024-04-06 PROCEDURE — 72040 X-RAY EXAM NECK SPINE 2-3 VW: CPT

## 2024-05-05 ENCOUNTER — ANESTHESIA EVENT (OUTPATIENT)
Dept: SURGERY | Facility: MEDICAL CENTER | Age: 59
End: 2024-05-05
Payer: MEDICARE

## 2024-05-06 ENCOUNTER — APPOINTMENT (OUTPATIENT)
Dept: RADIOLOGY | Facility: MEDICAL CENTER | Age: 59
End: 2024-05-06
Attending: ORTHOPAEDIC SURGERY
Payer: MEDICARE

## 2024-05-06 ENCOUNTER — PHARMACY VISIT (OUTPATIENT)
Dept: PHARMACY | Facility: MEDICAL CENTER | Age: 59
End: 2024-05-06
Payer: COMMERCIAL

## 2024-05-06 ENCOUNTER — HOSPITAL ENCOUNTER (OUTPATIENT)
Facility: MEDICAL CENTER | Age: 59
End: 2024-05-06
Attending: ORTHOPAEDIC SURGERY | Admitting: ORTHOPAEDIC SURGERY
Payer: MEDICARE

## 2024-05-06 ENCOUNTER — ANESTHESIA (OUTPATIENT)
Dept: SURGERY | Facility: MEDICAL CENTER | Age: 59
End: 2024-05-06
Payer: MEDICARE

## 2024-05-06 VITALS
RESPIRATION RATE: 16 BRPM | HEART RATE: 62 BPM | BODY MASS INDEX: 25.74 KG/M2 | WEIGHT: 145.28 LBS | TEMPERATURE: 97.4 F | SYSTOLIC BLOOD PRESSURE: 114 MMHG | OXYGEN SATURATION: 94 % | HEIGHT: 63 IN | DIASTOLIC BLOOD PRESSURE: 61 MMHG

## 2024-05-06 PROCEDURE — RXMED WILLOW AMBULATORY MEDICATION CHARGE: Performed by: ORTHOPAEDIC SURGERY

## 2024-05-06 PROCEDURE — 28092 REMOVAL OF TOE LESIONS: CPT | Mod: T3 | Performed by: ORTHOPAEDIC SURGERY

## 2024-05-06 PROCEDURE — 28090 REMOVAL OF FOOT LESION: CPT | Mod: T7 | Performed by: ORTHOPAEDIC SURGERY

## 2024-05-06 PROCEDURE — RXOTC WILLOW AMBULATORY OTC CHARGE: Performed by: PHARMACIST

## 2024-05-06 PROCEDURE — 27635 REMOVE LOWER LEG BONE LESION: CPT | Mod: 59,LT | Performed by: ORTHOPAEDIC SURGERY

## 2024-05-06 PROCEDURE — 28024 EXPLORATION OF TOE JOINT: CPT | Mod: T3 | Performed by: ORTHOPAEDIC SURGERY

## 2024-05-06 PROCEDURE — 29898 ANKLE ARTHROSCOPY/SURGERY: CPT | Mod: 80ROC,LT | Performed by: STUDENT IN AN ORGANIZED HEALTH CARE EDUCATION/TRAINING PROGRAM

## 2024-05-06 PROCEDURE — 8968 PR NO CHARGE - PROCEDURE: Mod: 80ROC | Performed by: STUDENT IN AN ORGANIZED HEALTH CARE EDUCATION/TRAINING PROGRAM

## 2024-05-06 PROCEDURE — 29898 ANKLE ARTHROSCOPY/SURGERY: CPT | Mod: LT | Performed by: ORTHOPAEDIC SURGERY

## 2024-05-06 RX ORDER — OXYCODONE HCL 5 MG/5 ML
10 SOLUTION, ORAL ORAL
Status: COMPLETED | OUTPATIENT
Start: 2024-05-06 | End: 2024-05-06

## 2024-05-06 RX ORDER — ONDANSETRON 2 MG/ML
4 INJECTION INTRAMUSCULAR; INTRAVENOUS
Status: DISCONTINUED | OUTPATIENT
Start: 2024-05-06 | End: 2024-05-06 | Stop reason: HOSPADM

## 2024-05-06 RX ORDER — DIPHENHYDRAMINE HYDROCHLORIDE 50 MG/ML
12.5 INJECTION INTRAMUSCULAR; INTRAVENOUS
Status: DISCONTINUED | OUTPATIENT
Start: 2024-05-06 | End: 2024-05-06 | Stop reason: HOSPADM

## 2024-05-06 RX ORDER — DEXAMETHASONE SODIUM PHOSPHATE 4 MG/ML
INJECTION, SOLUTION INTRA-ARTICULAR; INTRALESIONAL; INTRAMUSCULAR; INTRAVENOUS; SOFT TISSUE PRN
Status: DISCONTINUED | OUTPATIENT
Start: 2024-05-06 | End: 2024-05-06 | Stop reason: SURG

## 2024-05-06 RX ORDER — SODIUM CHLORIDE, SODIUM LACTATE, POTASSIUM CHLORIDE, CALCIUM CHLORIDE 600; 310; 30; 20 MG/100ML; MG/100ML; MG/100ML; MG/100ML
INJECTION, SOLUTION INTRAVENOUS CONTINUOUS
Status: DISCONTINUED | OUTPATIENT
Start: 2024-05-06 | End: 2024-05-06 | Stop reason: HOSPADM

## 2024-05-06 RX ORDER — OXYCODONE HCL 5 MG/5 ML
5 SOLUTION, ORAL ORAL
Status: COMPLETED | OUTPATIENT
Start: 2024-05-06 | End: 2024-05-06

## 2024-05-06 RX ORDER — HYDROMORPHONE HYDROCHLORIDE 1 MG/ML
0.1 INJECTION, SOLUTION INTRAMUSCULAR; INTRAVENOUS; SUBCUTANEOUS
Status: DISCONTINUED | OUTPATIENT
Start: 2024-05-06 | End: 2024-05-06 | Stop reason: HOSPADM

## 2024-05-06 RX ORDER — ACETAMINOPHEN 500 MG
1000 TABLET ORAL ONCE
Status: COMPLETED | OUTPATIENT
Start: 2024-05-06 | End: 2024-05-06

## 2024-05-06 RX ORDER — HYDROMORPHONE HYDROCHLORIDE 1 MG/ML
0.4 INJECTION, SOLUTION INTRAMUSCULAR; INTRAVENOUS; SUBCUTANEOUS
Status: DISCONTINUED | OUTPATIENT
Start: 2024-05-06 | End: 2024-05-06 | Stop reason: HOSPADM

## 2024-05-06 RX ORDER — ONDANSETRON 2 MG/ML
INJECTION INTRAMUSCULAR; INTRAVENOUS PRN
Status: DISCONTINUED | OUTPATIENT
Start: 2024-05-06 | End: 2024-05-06 | Stop reason: SURG

## 2024-05-06 RX ORDER — MIDAZOLAM HYDROCHLORIDE 1 MG/ML
INJECTION INTRAMUSCULAR; INTRAVENOUS PRN
Status: DISCONTINUED | OUTPATIENT
Start: 2024-05-06 | End: 2024-05-06 | Stop reason: SURG

## 2024-05-06 RX ORDER — LIDOCAINE HYDROCHLORIDE 20 MG/ML
INJECTION, SOLUTION EPIDURAL; INFILTRATION; INTRACAUDAL; PERINEURAL PRN
Status: DISCONTINUED | OUTPATIENT
Start: 2024-05-06 | End: 2024-05-06 | Stop reason: SURG

## 2024-05-06 RX ORDER — KETOROLAC TROMETHAMINE 15 MG/ML
INJECTION, SOLUTION INTRAMUSCULAR; INTRAVENOUS PRN
Status: DISCONTINUED | OUTPATIENT
Start: 2024-05-06 | End: 2024-05-06 | Stop reason: SURG

## 2024-05-06 RX ORDER — HALOPERIDOL 5 MG/ML
1 INJECTION INTRAMUSCULAR
Status: DISCONTINUED | OUTPATIENT
Start: 2024-05-06 | End: 2024-05-06 | Stop reason: HOSPADM

## 2024-05-06 RX ORDER — SODIUM CHLORIDE, SODIUM LACTATE, POTASSIUM CHLORIDE, CALCIUM CHLORIDE 600; 310; 30; 20 MG/100ML; MG/100ML; MG/100ML; MG/100ML
INJECTION, SOLUTION INTRAVENOUS CONTINUOUS
Status: ACTIVE | OUTPATIENT
Start: 2024-05-06 | End: 2024-05-06

## 2024-05-06 RX ORDER — HYDROMORPHONE HYDROCHLORIDE 1 MG/ML
0.2 INJECTION, SOLUTION INTRAMUSCULAR; INTRAVENOUS; SUBCUTANEOUS
Status: DISCONTINUED | OUTPATIENT
Start: 2024-05-06 | End: 2024-05-06 | Stop reason: HOSPADM

## 2024-05-06 RX ORDER — LIDOCAINE HYDROCHLORIDE 10 MG/ML
INJECTION, SOLUTION INFILTRATION; PERINEURAL
Status: DISCONTINUED | OUTPATIENT
Start: 2024-05-06 | End: 2024-05-06 | Stop reason: HOSPADM

## 2024-05-06 RX ORDER — MAGNESIUM HYDROXIDE 1200 MG/15ML
LIQUID ORAL
Status: COMPLETED | OUTPATIENT
Start: 2024-05-06 | End: 2024-05-06

## 2024-05-06 RX ORDER — BUPIVACAINE HYDROCHLORIDE 5 MG/ML
INJECTION, SOLUTION EPIDURAL; INTRACAUDAL
Status: DISCONTINUED | OUTPATIENT
Start: 2024-05-06 | End: 2024-05-06 | Stop reason: HOSPADM

## 2024-05-06 RX ORDER — CEFAZOLIN SODIUM 1 G/3ML
2 INJECTION, POWDER, FOR SOLUTION INTRAMUSCULAR; INTRAVENOUS ONCE
Status: COMPLETED | OUTPATIENT
Start: 2024-05-06 | End: 2024-05-06

## 2024-05-06 RX ADMIN — ACETAMINOPHEN 1000 MG: 500 TABLET, FILM COATED ORAL at 08:22

## 2024-05-06 RX ADMIN — MIDAZOLAM HYDROCHLORIDE 2 MG: 1 INJECTION, SOLUTION INTRAMUSCULAR; INTRAVENOUS at 10:34

## 2024-05-06 RX ADMIN — CEFAZOLIN 2 G: 1 INJECTION, POWDER, FOR SOLUTION INTRAMUSCULAR; INTRAVENOUS at 10:45

## 2024-05-06 RX ADMIN — FENTANYL CITRATE 50 MCG: 50 INJECTION, SOLUTION INTRAMUSCULAR; INTRAVENOUS at 11:50

## 2024-05-06 RX ADMIN — OXYCODONE HYDROCHLORIDE 10 MG: 5 SOLUTION ORAL at 11:44

## 2024-05-06 RX ADMIN — DEXAMETHASONE SODIUM PHOSPHATE 4 MG: 4 INJECTION INTRA-ARTICULAR; INTRALESIONAL; INTRAMUSCULAR; INTRAVENOUS; SOFT TISSUE at 10:44

## 2024-05-06 RX ADMIN — HYDROMORPHONE HYDROCHLORIDE 0.2 MG: 1 INJECTION, SOLUTION INTRAMUSCULAR; INTRAVENOUS; SUBCUTANEOUS at 12:18

## 2024-05-06 RX ADMIN — KETOROLAC TROMETHAMINE 15 MG: 15 INJECTION, SOLUTION INTRAMUSCULAR; INTRAVENOUS at 11:31

## 2024-05-06 RX ADMIN — FENTANYL CITRATE 50 MCG: 50 INJECTION, SOLUTION INTRAMUSCULAR; INTRAVENOUS at 11:31

## 2024-05-06 RX ADMIN — FENTANYL CITRATE 50 MCG: 50 INJECTION, SOLUTION INTRAMUSCULAR; INTRAVENOUS at 12:02

## 2024-05-06 RX ADMIN — ONDANSETRON 4 MG: 2 INJECTION INTRAMUSCULAR; INTRAVENOUS at 11:31

## 2024-05-06 RX ADMIN — LIDOCAINE HYDROCHLORIDE 80 MG: 20 INJECTION, SOLUTION EPIDURAL; INFILTRATION; INTRACAUDAL at 10:37

## 2024-05-06 RX ADMIN — HYDROMORPHONE HYDROCHLORIDE 0.2 MG: 1 INJECTION, SOLUTION INTRAMUSCULAR; INTRAVENOUS; SUBCUTANEOUS at 12:42

## 2024-05-06 RX ADMIN — FENTANYL CITRATE 50 MCG: 50 INJECTION, SOLUTION INTRAMUSCULAR; INTRAVENOUS at 11:48

## 2024-05-06 RX ADMIN — FENTANYL CITRATE 50 MCG: 50 INJECTION, SOLUTION INTRAMUSCULAR; INTRAVENOUS at 10:35

## 2024-05-06 RX ADMIN — HYDROMORPHONE HYDROCHLORIDE 0.4 MG: 1 INJECTION, SOLUTION INTRAMUSCULAR; INTRAVENOUS; SUBCUTANEOUS at 12:32

## 2024-05-06 RX ADMIN — HYDROMORPHONE HYDROCHLORIDE 0.4 MG: 1 INJECTION, SOLUTION INTRAMUSCULAR; INTRAVENOUS; SUBCUTANEOUS at 12:13

## 2024-05-06 RX ADMIN — HYDROMORPHONE HYDROCHLORIDE 0.4 MG: 1 INJECTION, SOLUTION INTRAMUSCULAR; INTRAVENOUS; SUBCUTANEOUS at 12:08

## 2024-05-06 RX ADMIN — SODIUM CHLORIDE, POTASSIUM CHLORIDE, SODIUM LACTATE AND CALCIUM CHLORIDE: 600; 310; 30; 20 INJECTION, SOLUTION INTRAVENOUS at 10:32

## 2024-05-06 RX ADMIN — HYDROMORPHONE HYDROCHLORIDE 0.4 MG: 1 INJECTION, SOLUTION INTRAMUSCULAR; INTRAVENOUS; SUBCUTANEOUS at 12:37

## 2024-05-06 RX ADMIN — FENTANYL CITRATE 50 MCG: 50 INJECTION, SOLUTION INTRAMUSCULAR; INTRAVENOUS at 12:00

## 2024-05-06 RX ADMIN — PROPOFOL 160 MG: 10 INJECTION, EMULSION INTRAVENOUS at 10:37

## 2024-05-06 ASSESSMENT — FIBROSIS 4 INDEX
FIB4 SCORE: 0.72
FIB4 SCORE: 0.72

## 2024-05-06 ASSESSMENT — PAIN DESCRIPTION - PAIN TYPE
TYPE: SURGICAL PAIN

## 2024-05-06 NOTE — ANESTHESIA TIME REPORT
Anesthesia Start and Stop Event Times       Date Time Event    5/6/2024 1030 Ready for Procedure     1032 Anesthesia Start     1138 Anesthesia Stop          Responsible Staff  05/06/24      Name Role Begin End    Prem Quinonez M.D. Anesth 1032 1138          Overtime Reason:  no overtime (within assigned shift)    Comments:

## 2024-05-06 NOTE — OR NURSING
Patient awake and alert and tolerating water without issue. VSS. Pt still in pain but states it is at a tolerable level. Pt dressing on bilateral feet are CDI. Post op shoes in place. Pt denies nausea at this time.     Pt daughter updated on pt status.     Report called to [facility] CAIN. Pt taken to stage 2 in stable condition.

## 2024-05-06 NOTE — LETTER
March 29, 2024    Patient Name: Jojo Walker  Surgeon Name: Home Freeman M.D.  Surgery Facility: [facility] ([address] [facility])  Surgery Date: 5/6/2024    The time of your surgery is not final and may change up to and until the day of your surgery. You will be contacted 24-48 hours prior to your surgery date with your check-in and surgery time.    If you will not be at one of the below numbers please call the surgery scheduler at [phone]  Preferred Phone: [phone]    BEFORE YOUR SURGERY   Pre Registration and/or Lab Work must be done within and no earlier than 28 days prior to your surgery date. Your scheduled facility will contact you regarding all required preregistration and/or lab work. If you have not been contacted within 7 days of your scheduled procedure please call [facility] at [phone] for an appointment as soon as possible.    The [facility] offers a class for patients undergoing a total hip/knee replacement surgery scheduled at our outpatient surgery center. Information about what to expect for preparation, the day of surgery and recovery will be given. We highly recommend bringing your support for surgery with you to the class, as well as any questions you may have. If you are interested in attending the class, please call [phone] for scheduling.     Pre op Appointment:  Instructions: Bring a list of all medications you are taking including the dosing and frequency.    DAY OF YOUR SURGERY  Nothing to eat or drink after midnight     Refrain from smoking any substance after midnight prior to surgery. Smoking may interfere with the anesthetic and frequently produces nausea during the recovery period.    Continue taking all lifesaving medications. Including the morning of your surgery with small sip of water.    Please do NOT take on the day of surgery:  Diuretics: examples- furosemide (Lasix), spironolactone,  hydrochlorothiazide  ACE-inhibitors: examples- lisinopril, ramipril, enalapril  “ARBs”: examples- losartan, Olmesartan, valsartan    Please arrive at the hospital/surgery center at the check-in time provided.     An adult will need to bring you and take you home after your surgery.     AFTER YOUR SURGERY  Post op Appointment:   Date: 05/21/24   Time: 09:30AM   With: Home Freeman MD   Location: [facility] [place] [address]    - Therapy- Your first appointment should be 2  week(s) after your surgery. For your convenience we have 4 Physical Therapy locations: [place], [facility], [place], and [facility]. Call our office ASAP to schedule an appointment at [phone] or take the enclosed Therapy Prescription to a facility of your choice.     TIME OFF WORK  FMLA or Disability forms can be faxed directly to: [phone] or you may drop them off at [address] [facility] [place] [address]. Our office charges a $35.00 fee per form. Forms will be completed within 10 business days of drop off and payment received. For the status of your forms you may contact our disability office directly [phone].    MEDICATION INSTRUCTIONS **Please read section completely**  The following medications should be stopped a minimum of 10 days prior to surgery:  All over the counter, Supplements & Herbal medications    Anorectics: Phentermine (Adipex-P, Lomaira and Suprenza), Phentermine-topiramate (Qsymia), Bupropion-naltrexone (Contrave)    **If you are on Bupropion for anxiety/depression, please continue this medication up until the day of surgery.     Opiod Partial Agonists/Opioid Antagonists: Buprenorphine (Suboxone, Belbuca, Butrans, Probuphine Implant, Sublocade), Naltrexone (ReVia, Vivitrol), Naloxone    Amphetamines: Dextroamphetamine/Amphetamine (Adderall, Mydayis), Methylphenidate Hydrochloride (Concerta, Metadate, Methylin, Ritalin)    The following medications should be stopped 5 days prior to surgery:  Blood  Thinners: Any Aspirin, Aspirin products, anti-inflammatories such as ibuprofen and any blood thinners such as Coumadin and Plavix. Please consult your prescribing physician if you are on life saving blood thinners, in regards to when to stop medications prior to surgery.     The following medications should be stopped a minimum of 3 days prior to surgery:  PDE-5 inhibitors: Sildenafil (Viagra), Tadalafil (Cialis), Vardenafil (Levitra), Avanafil (Stendra)    MAO Inhibitors: Rasagiline (Azilect), Selegiline (Eldepryl, Emsam, Selapar), Isocarboxazid (Marplan), Phenelzine (Nardil)       COVID and INFLUENZA NOTICE TO PATIENTS    Currently, the [facility] does not routinely test patients for COVID-19 or Influenza prior to their elective surgery.  However, if patients develop the following symptoms prior to their surgery date, they should voluntarily test for COVID-19 and Influenza and notify the surgical office of their condition and results.  The symptoms warranting testing would be any two of the following:    Fever (Temp above 100.4 F)  Chills  Cough  Shortness of breath or difficulty breathing  Fatigue  Myalgias (muscle or body aches)  Headache  Sore Throat  Congestion or Runny Nose  Nausea or vomiting  Diarrhea  New loss of taste or smell    Having these symptoms prior to surgery can significantly increase your risk of morbidity and mortality under anesthesia, which may compromise your health and require a transfer to a hospital for a higher level of care.  Therefore, it is advisable to notify the surgical team of any illness in order to get information for the appropriate time to delay the surgery to minimize these preventable risks.    Your health and safety are our number one priority at the [facility], and we are thankful that you entrust us with your care.  Please help us ensure the best possible surgical and anesthetic outcome by sharing appropriate health information  with our perioperative team and staff.  We look forward to taking great care of you!    Thank you for your time and consideration on this matter.    Rehan Steele MD  Medical Director  Anesthesiologist  [facility]

## 2024-05-06 NOTE — PROGRESS NOTES
Med Rec complete per pt  Allergies Reviewed    Pt reports NOT taking anticoagulant in the last 14 days

## 2024-05-06 NOTE — H&P
Surgery Orthopedic History & Physical Note    Date  5/6/2024    Primary Care Physician  Mera Mancia D.O.      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 female who presented with left ankle pain and right 3rd toe cyst/  She has a new onset of a cyst on her left 4th toe should like addressed.    Past Medical History:   Diagnosis Date    Anesthesia     PONV after cholecystectomy only    Ankylosing spondylitis (HCC)     Arthritis     ANKLES AND KNEES and hands,     Attention deficit hyperactivity disorder (ADHD) 01/18/2010     ICD-10 transition    Cholecystitis 06/03/2013    Congenital talipes equinovarus deformity of left foot 02/29/2016    Depressive disorder, not elsewhere classified 09/27/2010    Elevated antinuclear antibody (TARI) level 07/24/2013    Encounter for screening colonoscopy 07/24/2017    Normal Examination    History of ureter stent 4/1/16 04/02/2016    Inguinal lymphadenopathy 07/24/2013    Joint pain 12/10/2019    low back,hips, sciatica, left foot, 3/10    Joint swelling 07/24/2013    Lipoma     Multiple    Osteopenia     Personal history of venous thrombosis and embolism     left lung, PE after hysterectomy    PONV (postoperative nausea and vomiting)     Scoliosis     slight    Sjogren's disease (HCC)     sjogrens disease type B    Vitamin d deficiency 07/24/2013       Past Surgical History:   Procedure Laterality Date    PB INCISE FINGER TENDON SHEATH Right 10/17/2023    Procedure: RELEASE, TRIGGER FINGER;  Surgeon: Dwayne Yuan M.D.;  Location: SURGERY SAME DAY [facility];  Service: Orthopedics    EXCISION, CYST, UPPER EXTREMITY Right 10/17/2023    Procedure: RIGHT WRIST VOLAR GANGLION CYST EXCISION, RIGHT HAND MASS EXCISION, RIGHT THUMB TRIGGER RELEASE;  Surgeon: Dwayne Yuan M.D.;  Location: SURGERY SAME DAY [facility];  Service: Orthopedics    EXCISION, MASS, UPPER EXTREMITY Right 10/17/2023    Procedure: EXCISION, MASS, UPPER EXTREMITY;   Surgeon: Dwayne Yuan M.D.;  Location: SURGERY SAME DAY [facility];  Service: Orthopedics    INCISION AND DRAINAGE ORTHOPEDIC Left 2020    Procedure: INCISION AND DRAINAGE, WOUND, BY ORTHOPEDICS- ACHILLES DEBRIDE/WOUND REVISION AND INCISIONAL VAC;  Surgeon: Home Bhandari M.D.;  Location: SURGERY [facility];  Service: Orthopedics    TENDON REPAIR Left 2020    Procedure: REPAIR, TENDON- FLEXOR HALLUCIS LONGUS RELEASE 5th toe;  Surgeon: Home Bhandari M.D.;  Location: SURGERY [facility];  Service: Orthopedics    ORTHOPEDIC OSTEOTOMY Left 2019    Procedure: OSTEOTOMY, ORTHOPEDIC-MIDFOOT, HERRING;  Surgeon: Home Bhandari M.D.;  Location: SURGERY [facility];  Service: Orthopedics    COLONOSCOPY - ENDO  2017    Procedure: COLONOSCOPY - ENDO;  Surgeon: Melchor Malik M.D.;  Location: SURGERY [facility];  Service:     HARDWARE REMOVAL ORTHO Left 2017    Procedure: HARDWARE REMOVAL LEFT FOOT;  Surgeon: Easton Harrison M.D.;  Location: [facility];  Service:     URETEROSCOPY Right 2016    Procedure: Right retrograde pyelogram, stent placement ;  Surgeon: Alan Nunes M.D.;  Location: SURGERY [facility];  Service:     HARDWARE REMOVAL ORTHO Left 1/15/2016    Procedure: HARDWARE REMOVAL LEFT FOOT;  Surgeon: Easton Harrison M.D.;  Location: SURGERY [facility];  Service:     ERIC BY LAPAROSCOPY  2013    Performed by Home Oneal M.D. at [facility]    HARDWARE REMOVAL ORTHO  2010    Performed by HOME BHANDARI at SURGERY SAME DAY [facility]    MASS EXCISION ORTHO  08    Performed by HOME BHANDARI at SURGERY SAME DAY [facility]    BREAST RECONSTRUCTION      FOOT SURGERY Bilateral     Multiple    HYSTERECTOMY LAPAROSCOPY  ~    OOPHORECTOMY Bilateral ~    [facility]    OTHER ORTHOPEDIC SURGERY      CLUB FEET REPAIR reports multiple surgeries    PRIMARY C SECTION       x 3 //       Current  Facility-Administered Medications   Medication Dose Route Frequency Provider Last Rate Last Admin    lactated ringers infusion   Intravenous Continuous Home Freeman M.D.        ceFAZolin (Ancef) injection 2 g  2 g Intravenous Once Kirill Ashton Ass't           Social History     Socioeconomic History    Marital status: Single     Spouse name: Not on file    Number of children: Not on file    Years of education: Not on file    Highest education level: Some college, no degree   Occupational History    Not on file   Tobacco Use    Smoking status: Former     Current packs/day: 0.00     Average packs/day: 0.5 packs/day for 20.0 years (10.0 ttl pk-yrs)     Types: Cigarettes     Start date: 1980     Quit date: 2000     Years since quittin.3    Smokeless tobacco: Never   Vaping Use    Vaping Use: Never used   Substance and Sexual Activity    Alcohol use: No    Drug use: No    Sexual activity: Not Currently     Birth control/protection: Abstinence   Other Topics Concern     Service No    Blood Transfusions No    Caffeine Concern Yes    Occupational Exposure No    Hobby Hazards No    Sleep Concern Yes    Stress Concern Yes    Weight Concern Yes    Special Diet Yes    Back Care Yes    Exercise No    Bike Helmet No    Seat Belt Yes    Self-Exams No   Social History Narrative    Single, on disability due to leg surgeries     Social Determinants of Health     Financial Resource Strain: Low Risk  (3/9/2023)    Overall Financial Resource Strain (CARDIA)     Difficulty of Paying Living Expenses: Not very hard   Food Insecurity: No Food Insecurity (3/9/2023)    Hunger Vital Sign     Worried About Running Out of Food in the Last Year: Never true     Ran Out of Food in the Last Year: Never true   Transportation Needs: No Transportation Needs (3/9/2023)    PRAPARE - Transportation     Lack of Transportation (Medical): No     Lack of Transportation (Non-Medical): No   Physical Activity: Inactive (3/9/2023)     Exercise Vital Sign     Days of Exercise per Week: 0 days     Minutes of Exercise per Session: 20 min   Stress: Stress Concern Present (3/9/2023)    [place] citizen [place] of Occupational Health - Occupational Stress Questionnaire     Feeling of Stress : To some extent   Social Connections: Socially Isolated (3/9/2023)    Social Connection and Isolation Panel [NHANES]     Frequency of Communication with Friends and Family: Twice a week     Frequency of Social Gatherings with Friends and Family: Never     Attends Scientologist Services: Never     Active Member of Clubs or Organizations: No     Attends Club or Organization Meetings: Never     Marital Status:    Intimate Partner Violence: Not on file   Housing Stability: Unknown (3/9/2023)    Housing Stability Vital Sign     Unable to Pay for Housing in the Last Year: Patient refused     Number of Places Lived in the Last Year: Not on file     Unstable Housing in the Last Year: No       Family History   Problem Relation Age of Onset    GI Disease Mother         Diverticulitis    Alcohol/Drug Sister     Cancer Maternal Grandfather         lung/melanoma    Cancer Maternal Grandmother         stomach    Cancer Maternal Uncle         brain    Dementia Paternal Grandfather        Allergies  Sulfa drugs, Codeine, Levaquin, Meperidine, Tape, Hydrocodone, Iodine, Bactrim, and Hydrocodone-acetaminophen    Review of Systems  Negative    Physical Exam  Left ankle pain.  She has a cyst on her dorsal dip of her left 4th and righ 3rd toes.  CV and pulm exam unremarkable.    Vital Signs  Blood Pressure: (!) 145/67   Temperature: 36.2 °C (97.1 °F)   Pulse: 60   Respiration: 14   Pulse Oximetry: 99 %       Labs:                    Radiology:  DX-PORTABLE FLUORO > 1 HOUR    (Results Pending)   DX-ANKLE 2- VIEWS LEFT    (Results Pending)         Assessment/Plan:  Pre-Op Diagnosis Codes:     * Ankle instability, left [M25.372]  Procedure(s):  LEFT ANKLE ARTHROSCOPY/DEBRIDEMENT, RIGHT 3RD  TOE CYST EXCISION  EXCISION, MASS, FOOT OR ANKLE

## 2024-05-06 NOTE — ANESTHESIA PREPROCEDURE EVALUATION
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          Oxempic - stopped 7 days    Asthma - mild    Sjrogrens and AS - controlled    Denies cardiac problems    Denies anesthesia problems    NPO    Allergies:  Sulfa drugs, Codeine, Levaquin, Meperidine, Tape, Hydrocodone, Iodine, Bactrim, and Hydrocodone-acetaminophen     Paper tape ok  Past Medical History:   Diagnosis Date    Anesthesia     PONV after cholecystectomy only    Ankylosing spondylitis (HCC)     Arthritis     ANKLES AND KNEES and hands,     Attention deficit hyperactivity disorder (ADHD) 01/18/2010     ICD-10 transition    Cholecystitis 06/03/2013    Congenital talipes equinovarus deformity of left foot 02/29/2016    Depressive disorder, not elsewhere classified 09/27/2010    Elevated antinuclear antibody (TARI) level 07/24/2013    Encounter for screening colonoscopy 07/24/2017    Normal Examination    History of ureter stent 4/1/16 04/02/2016    Inguinal lymphadenopathy 07/24/2013    Joint pain 12/10/2019    low back,hips, sciatica, left foot, 3/10    Joint swelling 07/24/2013    Lipoma     Multiple    Osteopenia     Personal history of venous thrombosis and embolism     left lung, PE after hysterectomy    PONV (postoperative nausea and vomiting)     Scoliosis     slight    Sjogren's disease (HCC)     sjogrens disease type B    Vitamin d deficiency 07/24/2013        Social History     Socioeconomic History    Marital status: Single     Spouse name: Not on file    Number of children: Not on file    Years of education: Not on file    Highest education level: Some college, no degree   Occupational History    Not on file   Tobacco Use    Smoking status:  Former     Current packs/day: 0.00     Average packs/day: 0.5 packs/day for 20.0 years (10.0 ttl pk-yrs)     Types: Cigarettes     Start date: 1980     Quit date: 2000     Years since quittin.3    Smokeless tobacco: Never   Vaping Use    Vaping Use: Never used   Substance and Sexual Activity    Alcohol use: No    Drug use: No    Sexual activity: Not Currently     Birth control/protection: Abstinence   Other Topics Concern     Service No    Blood Transfusions No    Caffeine Concern Yes    Occupational Exposure No    Hobby Hazards No    Sleep Concern Yes    Stress Concern Yes    Weight Concern Yes    Special Diet Yes    Back Care Yes    Exercise No    Bike Helmet No    Seat Belt Yes    Self-Exams No   Social History Narrative    Single, on disability due to leg surgeries     Social Determinants of Health     Financial Resource Strain: Low Risk  (3/9/2023)    Overall Financial Resource Strain (CARDIA)     Difficulty of Paying Living Expenses: Not very hard   Food Insecurity: No Food Insecurity (3/9/2023)    Hunger Vital Sign     Worried About Running Out of Food in the Last Year: Never true     Ran Out of Food in the Last Year: Never true   Transportation Needs: No Transportation Needs (3/9/2023)    PRAPARE - Transportation     Lack of Transportation (Medical): No     Lack of Transportation (Non-Medical): No   Physical Activity: Inactive (3/9/2023)    Exercise Vital Sign     Days of Exercise per Week: 0 days     Minutes of Exercise per Session: 20 min   Stress: Stress Concern Present (3/9/2023)    [employer] [place] [employer] - Occupational Stress Questionnaire     Feeling of Stress : To some extent   Social Connections: Socially Isolated (3/9/2023)    Social Connection and Isolation Panel [NHANES]     Frequency of Communication with Friends and Family: Twice a week     Frequency of Social Gatherings with Friends and Family: Never     Attends Denominational Services: Never     Active Member  of Clubs or Organizations: No     Attends Club or Organization Meetings: Never     Marital Status:    Intimate Partner Violence: Not on file   Housing Stability: Unknown (3/9/2023)    Housing Stability Vital Sign     Unable to Pay for Housing in the Last Year: Patient refused     Number of Places Lived in the Last Year: Not on file     Unstable Housing in the Last Year: No        Past Surgical History:   Procedure Laterality Date    PB INCISE FINGER TENDON SHEATH Right 10/17/2023    Procedure: RELEASE, TRIGGER FINGER;  Surgeon: Dwayne Yuan M.D.;  Location: SURGERY SAME DAY [facility];  Service: Orthopedics    EXCISION, CYST, UPPER EXTREMITY Right 10/17/2023    Procedure: RIGHT WRIST VOLAR GANGLION CYST EXCISION, RIGHT HAND MASS EXCISION, RIGHT THUMB TRIGGER RELEASE;  Surgeon: Dwayne Yuan M.D.;  Location: SURGERY SAME DAY [facility];  Service: Orthopedics    EXCISION, MASS, UPPER EXTREMITY Right 10/17/2023    Procedure: EXCISION, MASS, UPPER EXTREMITY;  Surgeon: Dwayne Yuan M.D.;  Location: SURGERY SAME DAY [facility];  Service: Orthopedics    INCISION AND DRAINAGE ORTHOPEDIC Left 2/17/2020    Procedure: INCISION AND DRAINAGE, WOUND, BY ORTHOPEDICS- ACHILLES DEBRIDE/WOUND REVISION AND INCISIONAL VAC;  Surgeon: Home Freeman M.D.;  Location: [facility];  Service: Orthopedics    TENDON REPAIR Left 2/17/2020    Procedure: REPAIR, TENDON- FLEXOR HALLUCIS LONGUS RELEASE 5th toe;  Surgeon: Home Freeman M.D.;  Location: [facility];  Service: Orthopedics    ORTHOPEDIC OSTEOTOMY Left 12/27/2019    Procedure: OSTEOTOMY, ORTHOPEDIC-MIDFOOT, HERRING;  Surgeon: Home Freeman M.D.;  Location: [facility];  Service: Orthopedics    COLONOSCOPY - ENDO  7/24/2017    Procedure: COLONOSCOPY - ENDO;  Surgeon: Melchor Malik M.D.;  Location: [facility];  Service:     HARDWARE REMOVAL ORTHO Left 5/19/2017    Procedure: HARDWARE REMOVAL LEFT FOOT;  Surgeon: Easton MARTIN  SRIDHAR Harrison;  Location: SURGERY [facility];  Service:     URETEROSCOPY Right 2016    Procedure: Right retrograde pyelogram, stent placement ;  Surgeon: Alan Nunes M.D.;  Location: SURGERY [facility];  Service:     HARDWARE REMOVAL ORTHO Left 1/15/2016    Procedure: HARDWARE REMOVAL LEFT FOOT;  Surgeon: Easton Harrison M.D.;  Location: SURGERY [facility];  Service:     ERIC BY LAPAROSCOPY  2013    Performed by Home Oneal M.D. at SURGERY [facility]    HARDWARE REMOVAL ORTHO  2010    Performed by HOME BHANDARI at SURGERY SAME DAY [facility]    MASS EXCISION ORTHO  08    Performed by HOME BHANDARI at SURGERY SAME DAY [facility]    BREAST RECONSTRUCTION      FOOT SURGERY Bilateral     Multiple    HYSTERECTOMY LAPAROSCOPY  ~    OOPH[facility]Y B[facility] ~    [facility]    OTHER ORTHOPEDIC SURGERY      CLUB FEET REPAIR reports multiple surgeries    PRIMARY C SECTION       x 3 /        Lab Results   Component Value Date/Time    SODIUM 135 2023 03:34 PM    POTASSIUM 3.8 2023 03:34 PM    CHLORIDE 99 2023 03:34 PM    CO2 28 2023 03:34 PM    GLUCOSE 83 2023 03:34 PM    BUN 14 2023 03:34 PM    CREATININE 0.87 2023 03:34 PM         Lab Results   Component Value Date/Time    WBC 5.8 2023 03:34 PM    RBC 4.26 2023 03:34 PM    HEMOGLOBIN 12.3 2023 03:34 PM    HEMATOCRIT 38.5 2023 03:34 PM    MCV 90.4 2023 03:34 PM    MCH 28.9 2023 03:34 PM    MCHC 31.9 (L) 2023 03:34 PM    MPV 9.7 2023 03:34 PM    NEUTSPOLYS 54.70 2023 03:34 PM    LYMPHOCYTES 37.90 2023 03:34 PM    MONOCYTES 5.00 2023 03:34 PM    EOSINOPHILS 1.40 2023 03:34 PM    BASOPHILS 0.70 2023 03:34 PM         No results found for this or any previous visit.        Relevant Problems   Other   (positive) Non-radiographic axial spondyloarthritis (HCC)       Physical Exam    Airway    Mallampati: II  TM distance: >3 FB  Neck ROM: full       Cardiovascular - normal exam  Rhythm: regular  Rate: normal     Dental - normal exam             Pulmonary - normal exam     Abdominal    Neurological - normal exam                   Anesthesia Plan    ASA 2       Plan - general               Induction: intravenous    Postoperative Plan: Postoperative administration of opioids is intended.    Pertinent diagnostic labs and testing reviewed    Informed Consent:    Anesthetic plan and risks discussed with patient.    Use of blood products discussed with: patient whom consented to blood products.

## 2024-05-06 NOTE — OP REPORT
05/06/24       PREOPERATIVE DIAGNOSES:  Left ankle arthritis  Left fourth toe DIP ganglion cyst  Right third toe DIP ganglion cyst     POSTOPERATIVE DIAGNOSES:  Same    PROCEDURE PERFORMED:  Left ankle arthroscopic extensive debridement  Left partial excision distal tibia  Left excision fourth toe ganglion cyst  Left fourth DIP arthrotomy and exploration  Right third toe ganglion cyst excision  Right third DIP arthrotomy and exploration     SURGEON:  Home Freeman MD     FIRST ASSISTANT: Steph Garcia MD     SECOND ASSISTANT:  Anisa Soto CFA     ANESTHESIA:  General endotracheal with local     ESTIMATED BLOOD LOSS:  None.     COMPLICATIONS:  None.    FINDINGS:  See preoperative diagnosis     POSTOPERATIVE PLAN:  Weightbearing as tolerated  Follow-up in 2 weeks     INDICATIONS:  The patient is a pleasant 58 y.o. female who has had   problems with the left ankle and bilateral toes.  Options were discussed   including operative and nonoperative.  The patients elected to undergo operative   intervention.  The above procedure was discussed.  All questions were   answered.  Risks of surgery explained, which included but not limited to   explained wound problems, infection, nerve injury, vascular injury, need for   further surgery.  The patient understands that they could have persistent risk of    pain and need for further surgery.  The patients understands and accepts these risks   and agreed to proceed.  Site was marked by myself prior to receiving   psychotropic medicines.     PROCEDURE IN DETAIL:  The patient was brought to the operating room and    underwent general endotracheal anesthetic without complications.  Bilateral lower   extremity was prepped and draped in standard fashion in supine position with   all appropriate padding.  Positive site verification confirmed the appropriate   extremity as well as above procedure and confirmation that the patient received   preoperative antibiotics.  The leg  was elevated and tourniquet was inflated to 250 mmHg.    An 18-gauge needle was placed medial to the preoperatively marked   tibialis anterior at the level of the joint line.  Ankle was insufflated with   10 mL normal saline.  A 15 blade was used to make a full-thickness arthrotomy   and 4.0 scope was introduced.  An 18-gauge needle was then placed lateral to   the preoperative marked peroneus tertius at the level of joint line.  Skin   incision was made and blunt dissection was made down to the joint.  Shaver was   introduced.  There was extensive arthrofibrosis and synovitis at the   anterolateral aspect of her ankle down to the lateral gutter.  This was all   debrided.  There was also had some hypertrophic tissue in the syndesmosis, which   was also debrided.  All instrumentation was removed and the scope was placed into the lateral portal and shaver was introduced through the medial portal.  Debridement was perfomed in the anteromedial ankle and down into the medial gutter.  Inspection of the cartilage demonstrated there to be grade 3 changes diffusely.  There is no obvious flaps or loose bodies noted.     A medial arthrotomy was made and dissection was brought directly down to the joint.  Soft tissue was elevated off of the distal anterior tibia.  Bent Homans were then placed in for visualization.  An osteophyte that extended over the anterior part of the ankle and that was excised with an osteotome.  Osteophytes trending down to the medial gutter also removed.  Is also noted to have prominences on his talus that could be causing bony impingement and these were also removed with an osteotome.  There is one further on the neck which was left due to its lack of realistically causing any impingement.  The wound was irrigated with copious irrigation.  The wound was closed closing the deep layer followed by subcutaneous layer 3-0 Vicryl and 3 nylon for the skin.    Over the fourth toe and elliptical incision was  made over her cyst.  Is brought down to the level of the extensor tendon.  The tendon was spared but the entire cyst was removed.    Dissection then went deeper and on each side of the tendon on arthrotomy was made with dorsal debridement.  DIP was exposed.  There is no significant degenerative changes.  The wound was closed with interrupted nylon suture.    On the right side over her third DIP joint the exact procedure was performed with no changes or alterations from what was performed on the left toe including cyst removal and DIP exploration.    Sterile dressings were applied.  Tourniquet was released.  Patient was transferred to the recovery room in good condition.    Utilization of Dr. Garcia was necessary for patient positioning needing holding retracting wound closure and dressing placement.  The assistant was present throughout the entire procedure.

## 2024-05-06 NOTE — PROGRESS NOTES
size small post op shoes X2 have been delivered to patient to wear when ready. For both left and right LE.

## 2024-05-06 NOTE — ANESTHESIA POSTPROCEDURE EVALUATION
Patient: Jojo Walker    Procedure Summary       Date: 05/06/24 Room / Location: [place] [address] / SURGERY [facility]    Anesthesia Start: 1032 Anesthesia Stop: 1138    Procedures:       LEFT ANKLE ARTHROSCOPY/DEBRIDEMENT (Left: Ankle)      RIGHT 3RD TOE CYST EXCISION (Right: Foot)      Left 3rd Toe Cyst Excision (Left: Foot) Diagnosis:       Ankle instability, left      (ankle arthritis left, toes bilateral)    Surgeons: Home Freeman M.D. Responsible Provider: Prem Quinonez M.D.    Anesthesia Type: general ASA Status: 2            Final Anesthesia Type: general  Last vitals  BP   Blood Pressure: 114/61    Temp   36.3 °C (97.4 °F)    Pulse   62   Resp   16    SpO2   94 %      Anesthesia Post Evaluation    Patient location during evaluation: PACU  Patient participation: complete - patient participated  Level of consciousness: awake and alert    Airway patency: patent  Anesthetic complications: no  Cardiovascular status: hemodynamically stable  Respiratory status: acceptable  Hydration status: euvolemic    PONV: none          No notable events documented.     Nurse Pain Score: 4 (NPRS)

## 2024-05-06 NOTE — OR NURSING
1258 Arrived from PACU AXO, Pt's VSS; denies N/V; states pain is at tolerable level. Dressing CDI to  Sha feet. Post op shoes on.    1330 D/c orders received. IV dc'd. Pt changed into clothing with assistance. Discharge reviewed, Pt and family verbalized understanding and questions answered. Patient states ready to d/c home. Pt dc'd in w/c with CNA.

## 2024-05-06 NOTE — DISCHARGE INSTRUCTIONS
HOME CARE INSTRUCTIONS    ACTIVITY: Rest and take it easy for the first 24 hours.  A responsible adult is recommended to remain with you during that time.  It is normal to feel sleepy.  We encourage you to not do anything that requires balance, judgment or coordination.    FOR 24 HOURS DO NOT:  Drive, operate machinery or run household appliances.  Drink beer or alcoholic beverages.  Make important decisions or sign legal documents.    SPECIAL INSTRUCTIONS:   Weight bearing as tolerated bilateral lower extremities  Refer to DIALLO packet for further instructions    DIET: To avoid nausea, slowly advance diet as tolerated, avoiding spicy or greasy foods for the first day.  Add more substantial food to your diet according to your physician's instructions.  Babies can be fed formula or breast milk as soon as they are hungry.  INCREASE FLUIDS AND FIBER TO AVOID CONSTIPATION.    SURGICAL DRESSING/BATHING: Keep dressings clean and dry    MEDICATIONS: Resume taking daily medication.  Take prescribed pain medication with food.  If no medication is prescribed, you may take non-aspirin pain medication if needed.  PAIN MEDICATION CAN BE VERY CONSTIPATING.  Take a stool softener or laxative such as senokot, pericolace, or milk of magnesia if needed.      Last pain medication given at _Oxycodone at 11:44, Tylenol at 8:22am_.    A follow-up appointment should be arranged with your doctor; call to schedule.    You should CALL YOUR PHYSICIAN if you develop:  Fever greater than 101 degrees F.  Pain not relieved by medication, or persistent nausea or vomiting.  Excessive bleeding (blood soaking through dressing) or unexpected drainage from the wound.  Extreme redness or swelling around the incision site, drainage of pus or foul smelling drainage.  Inability to urinate or empty your bladder within 8 hours.  Problems with breathing or chest pain.    You should call 911 if you develop problems with breathing or chest pain.  If you are  unable to contact your doctor or surgical center, you should go to the nearest emergency room or urgent care center.  Physician's telephone #: [phone]    MILD FLU-LIKE SYMPTOMS ARE NORMAL.  YOU MAY EXPERIENCE GENERALIZED MUSCLE ACHES, THROAT IRRITATION, HEADACHE AND/OR SOME NAUSEA.    If any questions arise, call your doctor.  If your doctor is not available, please feel free to call the Surgical Center at [phone].  The Center is open Monday through Friday from 7AM to 7PM.      A registered nurse may call you a few days after your surgery to see how you are doing after your procedure.    You may also receive a survey in the mail within the next two weeks and we ask that you take a few moments to complete the survey and return it to us.  Our goal is to provide you with very good care and we value your comments.     Depression / Suicide Risk    As you are discharged from this [facility] facility, it is important to learn how to keep safe from harming yourself.    Recognize the warning signs:  Abrupt changes in personality, positive or negative- including increase in energy   Giving away possessions  Change in eating patterns- significant weight changes-  positive or negative  Change in sleeping patterns- unable to sleep or sleeping all the time   Unwillingness or inability to communicate  Depression  Unusual sadness, discouragement and loneliness  Talk of wanting to die  Neglect of personal appearance   Rebelliousness- reckless behavior  Withdrawal from people/activities they love  Confusion- inability to concentrate     If you or a loved one observes any of these behaviors or has concerns about self-harm, here's what you can do:  Talk about it- your feelings and reasons for harming yourself  Remove any means that you might use to hurt yourself (examples: pills, rope, extension cords, firearm)  Get professional help from the community (Mental Health, Substance Abuse, psychological counseling)  Do not be  alone:Call your Safe Contact- someone whom you trust who will be there for you.  Call your local CRISIS HOTLINE 806-5108 or 249-415-2212  Call your local Children's Mobile Crisis Response Team [place] (164) 833-6117 or www.InStaff  Call the toll free National Suicide Prevention Hotlines   National Suicide Prevention Lifeline 059-157-WZSR (0330)  [facility] 800-BJTIUWP (969-5297)    I acknowledge receipt and understanding of these Home Care instructions.

## 2024-12-30 ENCOUNTER — HOSPITAL ENCOUNTER (OUTPATIENT)
Dept: LAB | Facility: MEDICAL CENTER | Age: 59
End: 2024-12-30
Attending: FAMILY MEDICINE
Payer: MEDICARE

## 2024-12-30 ENCOUNTER — HOSPITAL ENCOUNTER (OUTPATIENT)
Dept: LAB | Facility: MEDICAL CENTER | Age: 59
End: 2024-12-30
Attending: INTERNAL MEDICINE
Payer: MEDICARE

## 2024-12-30 DIAGNOSIS — Z13.6 SCREENING FOR CARDIOVASCULAR CONDITION: ICD-10-CM

## 2024-12-30 DIAGNOSIS — Z13.1 SCREENING FOR DIABETES MELLITUS: ICD-10-CM

## 2024-12-30 DIAGNOSIS — M35.00 SJOGREN'S SYNDROME, WITH UNSPECIFIED ORGAN INVOLVEMENT (HCC): ICD-10-CM

## 2024-12-30 LAB
25(OH)D3 SERPL-MCNC: 69 NG/ML (ref 30–100)
ALBUMIN SERPL BCP-MCNC: 4.5 G/DL (ref 3.2–4.9)
ALBUMIN/GLOB SERPL: 1.3 G/DL
ALP SERPL-CCNC: 107 U/L (ref 30–99)
ALT SERPL-CCNC: 21 U/L (ref 2–50)
ANION GAP SERPL CALC-SCNC: 15 MMOL/L (ref 7–16)
AST SERPL-CCNC: 28 U/L (ref 12–45)
BASOPHILS # BLD AUTO: 0.7 % (ref 0–1.8)
BASOPHILS # BLD: 0.06 K/UL (ref 0–0.12)
BILIRUB SERPL-MCNC: 0.2 MG/DL (ref 0.1–1.5)
BUN SERPL-MCNC: 19 MG/DL (ref 8–22)
CALCIUM ALBUM COR SERPL-MCNC: 9.3 MG/DL (ref 8.5–10.5)
CALCIUM SERPL-MCNC: 9.7 MG/DL (ref 8.5–10.5)
CHLORIDE SERPL-SCNC: 97 MMOL/L (ref 96–112)
CHOLEST SERPL-MCNC: 233 MG/DL (ref 100–199)
CO2 SERPL-SCNC: 25 MMOL/L (ref 20–33)
CREAT SERPL-MCNC: 0.98 MG/DL (ref 0.5–1.4)
CRP SERPL HS-MCNC: 1.08 MG/DL (ref 0–0.75)
EOSINOPHIL # BLD AUTO: 0.06 K/UL (ref 0–0.51)
EOSINOPHIL NFR BLD: 0.7 % (ref 0–6.9)
ERYTHROCYTE [DISTWIDTH] IN BLOOD BY AUTOMATED COUNT: 41.5 FL (ref 35.9–50)
ERYTHROCYTE [DISTWIDTH] IN BLOOD BY AUTOMATED COUNT: 41.9 FL (ref 35.9–50)
ERYTHROCYTE [SEDIMENTATION RATE] IN BLOOD BY WESTERGREN METHOD: 19 MM/HOUR (ref 0–25)
GFR SERPLBLD CREATININE-BSD FMLA CKD-EPI: 67 ML/MIN/1.73 M 2
GLOBULIN SER CALC-MCNC: 3.6 G/DL (ref 1.9–3.5)
GLUCOSE SERPL-MCNC: 71 MG/DL (ref 65–99)
HCT VFR BLD AUTO: 39.7 % (ref 37–47)
HCT VFR BLD AUTO: 40.5 % (ref 37–47)
HDLC SERPL-MCNC: 83 MG/DL
HGB BLD-MCNC: 13.1 G/DL (ref 12–16)
HGB BLD-MCNC: 13.2 G/DL (ref 12–16)
IMM GRANULOCYTES # BLD AUTO: 0.02 K/UL (ref 0–0.11)
IMM GRANULOCYTES NFR BLD AUTO: 0.2 % (ref 0–0.9)
LDLC SERPL CALC-MCNC: 132 MG/DL
LYMPHOCYTES # BLD AUTO: 2.08 K/UL (ref 1–4.8)
LYMPHOCYTES NFR BLD: 24.9 % (ref 22–41)
MCH RBC QN AUTO: 29 PG (ref 27–33)
MCH RBC QN AUTO: 29.3 PG (ref 27–33)
MCHC RBC AUTO-ENTMCNC: 32.3 G/DL (ref 32.2–35.5)
MCHC RBC AUTO-ENTMCNC: 33.2 G/DL (ref 32.2–35.5)
MCV RBC AUTO: 88 FL (ref 81.4–97.8)
MCV RBC AUTO: 89.6 FL (ref 81.4–97.8)
MONOCYTES # BLD AUTO: 0.42 K/UL (ref 0–0.85)
MONOCYTES NFR BLD AUTO: 5 % (ref 0–13.4)
NEUTROPHILS # BLD AUTO: 5.7 K/UL (ref 1.82–7.42)
NEUTROPHILS NFR BLD: 68.5 % (ref 44–72)
NRBC # BLD AUTO: 0 K/UL
NRBC BLD-RTO: 0 /100 WBC (ref 0–0.2)
PLATELET # BLD AUTO: 356 K/UL (ref 164–446)
PLATELET # BLD AUTO: 377 K/UL (ref 164–446)
PMV BLD AUTO: 9.6 FL (ref 9–12.9)
PMV BLD AUTO: 9.7 FL (ref 9–12.9)
POTASSIUM SERPL-SCNC: 3.8 MMOL/L (ref 3.6–5.5)
PROT SERPL-MCNC: 8.1 G/DL (ref 6–8.2)
RBC # BLD AUTO: 4.51 M/UL (ref 4.2–5.4)
RBC # BLD AUTO: 4.52 M/UL (ref 4.2–5.4)
SODIUM SERPL-SCNC: 137 MMOL/L (ref 135–145)
TRIGL SERPL-MCNC: 91 MG/DL (ref 0–149)
WBC # BLD AUTO: 8 K/UL (ref 4.8–10.8)
WBC # BLD AUTO: 8.3 K/UL (ref 4.8–10.8)

## 2024-12-30 PROCEDURE — 82306 VITAMIN D 25 HYDROXY: CPT

## 2024-12-30 PROCEDURE — 36415 COLL VENOUS BLD VENIPUNCTURE: CPT

## 2024-12-30 PROCEDURE — 85027 COMPLETE CBC AUTOMATED: CPT

## 2024-12-30 PROCEDURE — 80061 LIPID PANEL: CPT

## 2024-12-30 PROCEDURE — 85025 COMPLETE CBC W/AUTO DIFF WBC: CPT

## 2024-12-30 PROCEDURE — 85652 RBC SED RATE AUTOMATED: CPT

## 2024-12-30 PROCEDURE — 86140 C-REACTIVE PROTEIN: CPT

## 2024-12-30 PROCEDURE — 80053 COMPREHEN METABOLIC PANEL: CPT

## 2025-01-09 PROBLEM — M19.041 OSTEOARTHRITIS OF RIGHT HAND: Status: RESOLVED | Noted: 2023-01-09 | Resolved: 2025-01-09

## 2025-01-09 PROBLEM — H90.3 SENSORINEURAL HEARING LOSS (SNHL) OF BOTH EARS: Chronic | Status: ACTIVE | Noted: 2023-03-28

## 2025-01-09 PROBLEM — G89.29 CHRONIC PAIN OF RIGHT HAND: Status: RESOLVED | Noted: 2023-01-09 | Resolved: 2025-01-09

## 2025-01-09 PROBLEM — R39.15 URINARY URGENCY: Chronic | Status: RESOLVED | Noted: 2020-10-05 | Resolved: 2025-01-09

## 2025-01-09 PROBLEM — R22.31 MASS OF RIGHT HAND: Status: RESOLVED | Noted: 2023-08-30 | Resolved: 2025-01-09

## 2025-01-09 PROBLEM — M65.311 TRIGGER THUMB OF RIGHT HAND: Status: RESOLVED | Noted: 2023-01-09 | Resolved: 2025-01-09

## 2025-01-09 PROBLEM — F11.90 CHRONIC, CONTINUOUS USE OF OPIOIDS: Chronic | Status: ACTIVE | Noted: 2023-10-17

## 2025-01-09 PROBLEM — M79.641 CHRONIC PAIN OF RIGHT HAND: Status: RESOLVED | Noted: 2023-01-09 | Resolved: 2025-01-09

## 2025-01-09 PROBLEM — M67.431 GANGLION CYST OF VOLAR ASPECT OF RIGHT WRIST: Status: RESOLVED | Noted: 2023-01-09 | Resolved: 2025-01-09

## 2025-04-12 ENCOUNTER — APPOINTMENT (OUTPATIENT)
Dept: RADIOLOGY | Facility: MEDICAL CENTER | Age: 60
End: 2025-04-12
Attending: STUDENT IN AN ORGANIZED HEALTH CARE EDUCATION/TRAINING PROGRAM
Payer: MEDICARE

## 2025-06-03 DIAGNOSIS — Z00.6 CLINICAL TRIAL PARTICIPANT: ICD-10-CM

## 2025-07-29 ENCOUNTER — HOSPITAL ENCOUNTER (OUTPATIENT)
Dept: LAB | Facility: MEDICAL CENTER | Age: 60
End: 2025-07-29
Attending: INTERNAL MEDICINE
Payer: MEDICARE

## 2025-07-29 DIAGNOSIS — Z79.620 LONG TERM (CURRENT) USE OF IMMUNOSUPPRESSIVE BIOLOGIC: ICD-10-CM

## 2025-07-29 DIAGNOSIS — M45.A0 NON-RADIOGRAPHIC AXIAL SPONDYLOARTHRITIS (HCC): ICD-10-CM

## 2025-07-29 LAB
ALBUMIN SERPL BCP-MCNC: 4.3 G/DL (ref 3.2–4.9)
ALBUMIN/GLOB SERPL: 1.3 G/DL
ALP SERPL-CCNC: 62 U/L (ref 30–99)
ALT SERPL-CCNC: 14 U/L (ref 2–50)
ANION GAP SERPL CALC-SCNC: 12 MMOL/L (ref 7–16)
AST SERPL-CCNC: 22 U/L (ref 12–45)
BASOPHILS # BLD AUTO: 1 % (ref 0–1.8)
BASOPHILS # BLD: 0.07 K/UL (ref 0–0.12)
BILIRUB SERPL-MCNC: 0.2 MG/DL (ref 0.1–1.5)
BUN SERPL-MCNC: 16 MG/DL (ref 8–22)
CALCIUM ALBUM COR SERPL-MCNC: 9.3 MG/DL (ref 8.5–10.5)
CALCIUM SERPL-MCNC: 9.5 MG/DL (ref 8.5–10.5)
CHLORIDE SERPL-SCNC: 101 MMOL/L (ref 96–112)
CO2 SERPL-SCNC: 24 MMOL/L (ref 20–33)
CREAT SERPL-MCNC: 0.98 MG/DL (ref 0.5–1.4)
CRP SERPL HS-MCNC: 0.3 MG/DL (ref 0–0.75)
EOSINOPHIL # BLD AUTO: 0.11 K/UL (ref 0–0.51)
EOSINOPHIL NFR BLD: 1.6 % (ref 0–6.9)
ERYTHROCYTE [DISTWIDTH] IN BLOOD BY AUTOMATED COUNT: 42.3 FL (ref 35.9–50)
ERYTHROCYTE [SEDIMENTATION RATE] IN BLOOD BY WESTERGREN METHOD: 16 MM/HOUR (ref 0–25)
GFR SERPLBLD CREATININE-BSD FMLA CKD-EPI: 66 ML/MIN/1.73 M 2
GLOBULIN SER CALC-MCNC: 3.3 G/DL (ref 1.9–3.5)
GLUCOSE SERPL-MCNC: 79 MG/DL (ref 65–99)
HCT VFR BLD AUTO: 36.8 % (ref 37–47)
HGB BLD-MCNC: 12.1 G/DL (ref 12–16)
IMM GRANULOCYTES # BLD AUTO: 0.02 K/UL (ref 0–0.11)
IMM GRANULOCYTES NFR BLD AUTO: 0.3 % (ref 0–0.9)
LYMPHOCYTES # BLD AUTO: 2.81 K/UL (ref 1–4.8)
LYMPHOCYTES NFR BLD: 40 % (ref 22–41)
MCH RBC QN AUTO: 29.1 PG (ref 27–33)
MCHC RBC AUTO-ENTMCNC: 32.9 G/DL (ref 32.2–35.5)
MCV RBC AUTO: 88.5 FL (ref 81.4–97.8)
MONOCYTES # BLD AUTO: 0.41 K/UL (ref 0–0.85)
MONOCYTES NFR BLD AUTO: 5.8 % (ref 0–13.4)
NEUTROPHILS # BLD AUTO: 3.6 K/UL (ref 1.82–7.42)
NEUTROPHILS NFR BLD: 51.3 % (ref 44–72)
NRBC # BLD AUTO: 0 K/UL
NRBC BLD-RTO: 0 /100 WBC (ref 0–0.2)
PLATELET # BLD AUTO: 345 K/UL (ref 164–446)
PMV BLD AUTO: 9.6 FL (ref 9–12.9)
POTASSIUM SERPL-SCNC: 4 MMOL/L (ref 3.6–5.5)
PROT SERPL-MCNC: 7.6 G/DL (ref 6–8.2)
RBC # BLD AUTO: 4.16 M/UL (ref 4.2–5.4)
SODIUM SERPL-SCNC: 137 MMOL/L (ref 135–145)
WBC # BLD AUTO: 7 K/UL (ref 4.8–10.8)

## 2025-07-29 PROCEDURE — 80053 COMPREHEN METABOLIC PANEL: CPT

## 2025-07-29 PROCEDURE — 86140 C-REACTIVE PROTEIN: CPT

## 2025-07-29 PROCEDURE — 85652 RBC SED RATE AUTOMATED: CPT

## 2025-07-29 PROCEDURE — 85025 COMPLETE CBC W/AUTO DIFF WBC: CPT

## 2025-07-29 PROCEDURE — 36415 COLL VENOUS BLD VENIPUNCTURE: CPT

## 2025-07-30 ENCOUNTER — RESEARCH ENCOUNTER (OUTPATIENT)
Dept: RESEARCH | Facility: MEDICAL CENTER | Age: 60
End: 2025-07-30
Payer: MEDICARE

## 2025-08-06 ENCOUNTER — APPOINTMENT (OUTPATIENT)
Dept: LAB | Facility: MEDICAL CENTER | Age: 60
End: 2025-08-06
Attending: FAMILY MEDICINE
Payer: MEDICARE

## (undated) DEVICE — SODIUM CHL IRRIGATION 0.9% 1000ML (12EA/CA)

## (undated) DEVICE — PADDING CAST 6 IN STERILE - 6 X 4 YDS (24/CA)

## (undated) DEVICE — STOCKINET BIAS 6 IN STERILE - (20/CA)

## (undated) DEVICE — PAD LAP STERILE 18 X 18 - (5/PK 40PK/CA)

## (undated) DEVICE — SUCTION INSTRUMENT YANKAUER BULBOUS TIP W/O VENT (50EA/CA)

## (undated) DEVICE — SET LEADWIRE 5 LEAD BEDSIDE DISPOSABLE ECG (1SET OF 5/EA)

## (undated) DEVICE — CHLORAPREP 26 ML APPLICATOR - ORANGE TINT(25/CA)

## (undated) DEVICE — GLOVE SZ 7 BIOGEL PI MICRO - PF LF (50PR/BX 4BX/CA)

## (undated) DEVICE — GLOVE SZ 6.5 BIOGEL PI MICRO - PF LF (50PR/BX)

## (undated) DEVICE — TUBING CLEARLINK DUO-VENT - C-FLO (48EA/CA)

## (undated) DEVICE — GOWN WARMING STANDARD FLEX - (30/CA)

## (undated) DEVICE — SHAVER 3.5 AGGRESSIVE + FORMLA (5EA/SP)

## (undated) DEVICE — GLOVE SZ 8 BIOGEL PI MICRO - PF LF (50PR/BX)

## (undated) DEVICE — GOWN SURGEONS X-LARGE - DISP. (30/CA)

## (undated) DEVICE — GLOVE BIOGEL PI INDICATOR SZ 8.0 SURGICAL PF LF -(50/BX 4BX/CA)

## (undated) DEVICE — SPLINT PLASTER 5 IN X 30 IN - (50EA/BX 6BX/CA)

## (undated) DEVICE — CANISTER SUCTION 3000ML MECHANICAL FILTER AUTO SHUTOFF MEDI-VAC NONSTERILE LF DISP  (40EA/CA)

## (undated) DEVICE — DRAPE LARGE 3 QUARTER - (20/CA)

## (undated) DEVICE — BLADE SURGICAL #15 - (50/BX 3BX/CA)

## (undated) DEVICE — TOURNIQUET CUFF 34 X 4 ONE PORT DISP - STERILE (10/BX)

## (undated) DEVICE — PACK LOWER EXTREMITY - (2/CA)

## (undated) DEVICE — PROTECTOR ULNA NERVE - (36PR/CA)

## (undated) DEVICE — KIT ROOM DECONTAMINATION

## (undated) DEVICE — NEPTUNE 4 PORT MANIFOLD - (20/PK)

## (undated) DEVICE — KIT ANESTHESIA W/CIRCUIT & 3/LT BAG W/FILTER (20EA/CA)

## (undated) DEVICE — TUBING CASSETTE CROSSFLOW INTEGRATED (1/EA)  ORDER MULTIPLES OF 10

## (undated) DEVICE — HEAD HOLDER JUNIOR/ADULT

## (undated) DEVICE — CANNULA O2 COMFORT SOFT EAR ADULT 7 FT TUBING (50/CA)

## (undated) DEVICE — SET IRRIGATION CYSTOSCOPY TUBE L80 IN (20EA/CA)

## (undated) DEVICE — GLOVE BIOGEL INDICATOR SZ 6.5 SURGICAL PF LTX - (50PR/BX 4BX/CA)

## (undated) DEVICE — WATER IRRIG. STER. 1500 ML - (9/CA)

## (undated) DEVICE — MASK, LARYNGEAL AIRWAY #4

## (undated) DEVICE — PADDING CAST 4 IN STERILE - 4 X 4 YDS (24/CA)

## (undated) DEVICE — SENSOR SPO2 NEO LNCS ADHESIVE (20/BX) SEE USER NOTES

## (undated) DEVICE — SUTURE GENERAL

## (undated) DEVICE — DRESSING 3X8 ADAPTIC GAUZE - NON-ADHERING (36/PK 6PK/BX)

## (undated) DEVICE — ELECTRODE DUAL RETURN W/ CORD - (50/PK)

## (undated) DEVICE — GLOVE BIOGEL SZ 7.5 SURGICAL PF LTX - (50PR/BX 4BX/CA)

## (undated) DEVICE — BLADE SURGICAL CLIPPER - (50EA/CA)

## (undated) DEVICE — TUBE E-T HI-LO CUFF 7.0MM (10EA/PK)

## (undated) DEVICE — BANDAGE STERILE 2 IN X 75 IN (12EA/BX 8BX/CA)

## (undated) DEVICE — GLOVE BIOGEL ECLIPSE PF LATEX SIZE 8.0  (50PR/BX)

## (undated) DEVICE — LACTATED RINGERS INJ 1000 ML - (14EA/CA 60CA/PF)

## (undated) DEVICE — MASK AIRWAY FLEXIBLE SINGLE-USE SIZE 4 ADULTS (10EA/BX)

## (undated) DEVICE — SLEEVE, VASO, THIGH, MED

## (undated) DEVICE — KIT  I.V. START (100EA/CA)

## (undated) DEVICE — COVER LIGHT HANDLE FLEXIBLE - SOFT (2EA/PK 80PK/CA)

## (undated) DEVICE — BANDAGE ELASTIC 6 HONEYCOMB - 6X5YD LF (20/CA)"

## (undated) DEVICE — ELECTRODE 850 FOAM ADHESIVE - HYDROGEL RADIOTRNSPRNT (50/PK)

## (undated) DEVICE — GLOVE BIOGEL ECLIPSE  PF LATEX SIZE 6.5 (50PR/BX)

## (undated) DEVICE — SET EXTENSION WITH 2 PORTS (48EA/CA) ***PART #2C8610 IS A SUBSTITUTE*****

## (undated) DEVICE — PAD PREP 24 X 48 CUFFED - (100/CA)

## (undated) DEVICE — CONTAINER SPECIMEN BAG OR - STERILE 4 OZ W/LID (100EA/CA)

## (undated) DEVICE — MASK OXYGEN VNYL ADLT MED CONC WITH 7 FOOT TUBING  - (50EA/CA)

## (undated) DEVICE — SUTURE 3-0 ETHILON PS-1 (36PK/BX)

## (undated) DEVICE — GLOVE BIOGEL INDICATOR SZ 7SURGICAL PF LTX - (50/BX 4BX/CA)

## (undated) DEVICE — SUTURE 0 VICRYL PLUS CT-2 - 27 INCH (36/BX)

## (undated) DEVICE — SENSOR OXIMETER ADULT SPO2 RD SET (20EA/BX)

## (undated) DEVICE — DRESSING ABDOMINAL PAD STERILE 8 X 10" (360EA/CA)"

## (undated) DEVICE — DRAPE C ARMOR (12EA/CA)

## (undated) DEVICE — TRAY SRGPRP PVP IOD WT PRP - (20/CA)

## (undated) DEVICE — CANISTER SUCTION RIGID RED 1500CC (40EA/CA)

## (undated) DEVICE — SODIUM CHL. IRRIGATION 0.9% 3000ML (4EA/CA 65CA/PF)

## (undated) DEVICE — SOD. CHL. INJ. 0.9% 1000 ML - (14EA/CA 60CA/PF)

## (undated) DEVICE — TUBE CONNECTING SUCTION - CLEAR PLASTIC STERILE 72 IN (50EA/CA)

## (undated) DEVICE — TOWEL STOP TIMEOUT SAFETY FLAG (40EA/CA)

## (undated) DEVICE — GLOVE BIOGEL PI INDICATOR SZ 7.0 SURGICAL PF LF - (50/BX 4BX/CA)

## (undated) DEVICE — SUTURE 3-0 VICRYL PLUS SH - 27 INCH (36/BX)

## (undated) DEVICE — SCRUB SOLUTION EXIDINE 4% 40Z - 48/CS CHLORAHEXADINE GLUCONATE

## (undated) DEVICE — GLOVE SZ 6 BIOGEL PI MICRO - PF LF (50PR/BX 4BX/CA)

## (undated) DEVICE — SUTURE 3-0 VICRYL PLUS SH - 8X 18 INCH (12/BX)

## (undated) DEVICE — DISPOSABLE WOUND VAC PICO 10 X 20 CM - WOUND CARE (3/CA)

## (undated) DEVICE — SLEEVE VASO CALF MED - (10PR/CA)

## (undated) DEVICE — GLOVE BIOGEL PI INDICATOR SZ 8.5 SURGICAL PF LF - (50PR/BX 4BX/CA)

## (undated) DEVICE — MASK ANESTHESIA ADULT  - (100/CA)

## (undated) DEVICE — DRAPE C-ARM LARGE 41IN X 74 IN - (10/BX 2BX/CA)

## (undated) DEVICE — BOVIE NEEDLE TIP INSULATD NON-SAFETY 2CM (50/PK)

## (undated) DEVICE — COVER LIGHT HANDLE ALC PLUS DISP (18EA/BX)

## (undated) DEVICE — SUTURE 4-0 ETHILON FS-2 18 (36PK/BX)"

## (undated) DEVICE — GLOVE BIOGEL PI INDICATOR SZ 6.0 SURGICAL PF LF -(200PR/CA)

## (undated) DEVICE — WATER IRRIGATION STERILE 1000ML (12EA/CA)

## (undated) DEVICE — STOCKINET BIAS 4 IN STERILE - (20/CA)

## (undated) DEVICE — DRAPE ARTHROSCOPE (ACL) - (10/CA)

## (undated) DEVICE — PACK UPPER EXTREMITY (2EA/CA)

## (undated) DEVICE — WRAP COBAN SELF-ADHERENT 6 IN X  5YDS STERILE TAN (12/CA)

## (undated) DEVICE — TOURNIQUET CUFF 18 X 3 ONE PORT DISP - STERILE (10/BX)

## (undated) DEVICE — GLOVE BIOGEL SZ 6.5 SURGICAL PF LTX (50PR/BX 4BX/CA)

## (undated) DEVICE — BLOCK

## (undated) DEVICE — SUTURE 3-0 VICRYL PLUS RB-1 - 8 X 18 INCH (12/BX)